# Patient Record
Sex: FEMALE | Race: WHITE | Employment: FULL TIME | URBAN - METROPOLITAN AREA
[De-identification: names, ages, dates, MRNs, and addresses within clinical notes are randomized per-mention and may not be internally consistent; named-entity substitution may affect disease eponyms.]

---
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
## 2020-02-26 ENCOUNTER — HOSPITAL ENCOUNTER (EMERGENCY)
Age: 20
Discharge: HOME | End: 2020-02-26

## 2020-02-26 VITALS
SYSTOLIC BLOOD PRESSURE: 108 MMHG | OXYGEN SATURATION: 96 % | DIASTOLIC BLOOD PRESSURE: 64 MMHG | BODY MASS INDEX: 25.71 KG/M2 | HEIGHT: 66 IN | HEART RATE: 90 BPM | WEIGHT: 160 LBS | RESPIRATION RATE: 16 BRPM | TEMPERATURE: 97.8 F

## 2020-02-26 PROCEDURE — 99282 EMERGENCY DEPT VISIT SF MDM: CPT

## 2020-02-26 RX ORDER — PREDNISONE 10 MG/1
40 TABLET ORAL DAILY
Qty: 12 TABLET | Refills: 0 | Status: SHIPPED | OUTPATIENT
Start: 2020-02-26 | End: 2020-02-29

## 2020-02-26 RX ORDER — AMOXICILLIN AND CLAVULANATE POTASSIUM 875; 125 MG/1; MG/1
1 TABLET, FILM COATED ORAL 2 TIMES DAILY
Qty: 20 TABLET | Refills: 0 | Status: SHIPPED | OUTPATIENT
Start: 2020-02-26 | End: 2020-03-07

## 2020-02-26 NOTE — ED PROVIDER NOTES
[facility]         Department of Emergency Medicine   ED  Provider Note  Admit Date/RoomTime: 2/26/2020  5:46 PM  ED Room: 30/30  Chief Complaint:   Otalgia (started on left side three weeks ago and has moved to right. Has been seen at [facility] and given claritan but it has gotten worse.  ) and Nasal Congestion    History of Present Illness   Source of history provided by:  patient. History/Exam Limitations: none. Rich Juares is a 23 y.o. old female with a past medical history of:   Past Medical History:   Diagnosis Date    GERD (gastroesophageal reflux disease)     presents to the emergency department by private vehicle with a friend for complaints of inability to hear out of her left ear and states it moved to the right ear and she will get a 'popping' sensation every time she blows her nose. She went to the pharmacy and they told her to take Claritin D and  eardrops over-the-counter and feels she has sinus pain with no improvement. She denies any fever or chills and she reports that this has been ongoing for the past 3 weeks and has gotten worse. She denies any head trauma, fever, chills, neck pain, cough, sore throat, nausea, vomiting or any other symptoms. There has been NO abdominal pain, appetite decrease, chest pain, conjunctivitis, cough, diarrhea, dizziness, dysuria, fatigue, headache, hoarseness, nausea, neck stiffness, rash, swollen glands, urinary frequency, vomiting or wheezing. ROS   Pertinent positives and negatives are stated within HPI, all other systems reviewed and are negative. Past Surgical History:  has no past surgical history on file. Social History:  reports that she has never smoked. She has never used smokeless tobacco. She reports that she does not drink alcohol or use drugs. Family History: family history includes No Known Problems in her father and mother. Allergies: Patient has no known allergies.     Physical Exam           ED Triage Vitals   BP Temp Temp was not done. Upper respiratory infection is unlikely to  be viral in etiology. Antibiotics are indicated at this time based on clinical presentation and physical findings. She is not hypoxic. Patient is well appearing, non toxic and appropriate for outpatient management. Instructed on avoiding blowing her nose and will give referral to ENT for hearing loss. Plan of Care: Normal progression of disease discussed. All questions answered. Instruction provided in the use of fluids, vaporizer, acetaminophen, and other OTC medication for symptom control. Extra fluids  Analgesics as needed, dose reviewed. Follow up as needed should symptoms fail to improve. Counseling: The emergency provider has spoken with the patient and discussed todays results, in addition to providing specific details for the plan of care and counseling regarding the diagnosis and prognosis. Questions are answered at this time and they are agreeable with the plan. Assessment     1. Acute sinusitis, recurrence not specified, unspecified location    2. Hearing loss of left ear, unspecified hearing loss type      Plan   Discharge to home  Patient condition is good    New Medications     Discharge Medication List as of 2/26/2020  6:39 PM      START taking these medications    Details   amoxicillin-clavulanate (AUGMENTIN) 875-125 MG per tablet Take 1 tablet by mouth 2 times daily for 10 days, Disp-20 tablet, R-0Print      predniSONE (DELTASONE) 10 MG tablet Take 4 tablets by mouth daily for 3 days, Disp-12 tablet, R-0Print           Electronically signed by NOEMI Graff CNP   DD: 2/26/20  **This report was transcribed using voice recognition software. Every effort was made to ensure accuracy; however, inadvertent computerized transcription errors may be present.   END OF ED PROVIDER NOTE        NOEMI Graff CNP  02/27/20 [id number]

## 2021-07-03 ENCOUNTER — HOSPITAL ENCOUNTER (EMERGENCY)
Age: 21
Discharge: HOME | End: 2021-07-03
Attending: EMERGENCY MEDICINE

## 2021-07-03 VITALS
TEMPERATURE: 97.9 F | SYSTOLIC BLOOD PRESSURE: 98 MMHG | HEART RATE: 70 BPM | RESPIRATION RATE: 14 BRPM | WEIGHT: 160 LBS | HEIGHT: 66 IN | DIASTOLIC BLOOD PRESSURE: 59 MMHG | BODY MASS INDEX: 25.71 KG/M2 | OXYGEN SATURATION: 97 %

## 2021-07-03 DIAGNOSIS — F19.10 DRUG ABUSE (HCC): Primary | ICD-10-CM

## 2021-07-03 LAB
CHP ED QC CHECK: YES
GLUCOSE BLD-MCNC: 129 MG/DL
HCG, URINE, POC: NEGATIVE
Lab: NORMAL
METER GLUCOSE: 129 MG/DL (ref 74–99)
NEGATIVE QC PASS/FAIL: NORMAL
POSITIVE QC PASS/FAIL: NORMAL

## 2021-07-03 PROCEDURE — 82962 GLUCOSE BLOOD TEST: CPT

## 2021-07-03 PROCEDURE — 99284 EMERGENCY DEPT VISIT MOD MDM: CPT

## 2021-07-03 ASSESSMENT — ENCOUNTER SYMPTOMS
SHORTNESS OF BREATH: 0
DIARRHEA: 0
WHEEZING: 0
EYE REDNESS: 0
SORE THROAT: 0
COUGH: 0
ABDOMINAL DISTENTION: 0
EYE PAIN: 0
VOMITING: 0
NAUSEA: 0
BACK PAIN: 0
EYE DISCHARGE: 0
SINUS PRESSURE: 0

## 2021-07-03 NOTE — ED PROVIDER NOTES
Patient reports that she was up all night last night with her boyfriend and other friends partying. She admits to alcohol throughout the night as well as marijuana and this morning took 2 tablets of Ultram.  She then fell asleep and her friends had difficulty waking her. They summoned EMS. Upon arrival here, she is awake but sleepy. No complaints. The history is provided by the patient. Drug / Alcohol Assessment  This is a new problem. The current episode started 1 to 2 hours ago. The problem occurs constantly. The problem has been gradually improving. Pertinent negatives include no chest pain, no headaches and no shortness of breath. Nothing aggravates the symptoms. Nothing relieves the symptoms. She has tried nothing for the symptoms. Review of Systems   Constitutional: Negative for chills and fever. HENT: Negative for ear pain, sinus pressure and sore throat. Eyes: Negative for pain, discharge and redness. Respiratory: Negative for cough, shortness of breath and wheezing. Cardiovascular: Negative for chest pain. Gastrointestinal: Negative for abdominal distention, diarrhea, nausea and vomiting. Genitourinary: Negative for dysuria and frequency. Musculoskeletal: Negative for arthralgias and back pain. Skin: Negative for rash and wound. Neurological: Negative for weakness and headaches. Hematological: Negative for adenopathy. Psychiatric/Behavioral: Negative for suicidal ideas. All other systems reviewed and are negative. Physical Exam  Vitals and nursing note reviewed. Constitutional:       Appearance: She is well-developed. HENT:      Head: Normocephalic and atraumatic. Eyes:      Pupils: Pupils are equal, round, and reactive to light. Cardiovascular:      Rate and Rhythm: Normal rate and regular rhythm. Heart sounds: Normal heart sounds. No murmur heard. Pulmonary:      Effort: Pulmonary effort is normal. No respiratory distress.       Breath Positive QC Pass/Fail Pass     Negative QC Pass/Fail Pass    POCT Glucose   Result Value Ref Range    Glucose 129 mg/dL    QC OK? yes    POCT Glucose   Result Value Ref Range    Meter Glucose 129 (H) 74 - 99 mg/dL       Radiology:  No orders to display       ------------------------- NURSING NOTES AND VITALS REVIEWED ---------------------------  Date / Time Roomed:  7/3/2021  5:45 AM  ED Bed Assignment:  21/21    The nursing notes within the ED encounter and vital signs as below have been reviewed. BP (!) 98/59   Pulse 70   Temp 97.9 °F (36.6 °C) (Oral)   Resp 14   Ht 5' 6\" (1.676 m)   Wt 160 lb (72.6 kg)   SpO2 97%   BMI 25.82 kg/m²   Oxygen Saturation Interpretation: Normal      ------------------------------------------ PROGRESS NOTES ------------------------------------------  8:26 AM EDT  I have spoken with the patient and discussed todays results, in addition to providing specific details for the plan of care and counseling regarding the diagnosis and prognosis. Their questions are answered at this time and they are agreeable with the plan. I discussed at length with them reasons for immediate return here for re evaluation. They will followup with their primary care physician by calling their office tomorrow. --------------------------------- ADDITIONAL PROVIDER NOTES ---------------------------------  At this time the patient is without objective evidence of an acute process requiring hospitalization or inpatient management. They have remained hemodynamically stable throughout their entire ED visit and are stable for discharge with outpatient follow-up. The plan has been discussed in detail and they are aware of the specific conditions for emergent return, as well as the importance of follow-up. New Prescriptions    No medications on file       Diagnosis:  1. Drug abuse ([facility] [address]        Disposition:  Patient's disposition: Discharge to home  Patient's condition is stable.        Bill Calabrese DO Abe  07/03/21 [id number]

## 2022-11-28 ENCOUNTER — INITIAL PRENATAL (OUTPATIENT)
Dept: OBGYN | Age: 22
End: 2022-11-28

## 2022-11-28 VITALS
HEIGHT: 66 IN | HEART RATE: 107 BPM | SYSTOLIC BLOOD PRESSURE: 133 MMHG | WEIGHT: 162 LBS | BODY MASS INDEX: 26.03 KG/M2 | DIASTOLIC BLOOD PRESSURE: 62 MMHG

## 2022-11-28 DIAGNOSIS — Z32.01 POSITIVE URINE PREGNANCY TEST: ICD-10-CM

## 2022-11-28 DIAGNOSIS — Z32.01 POSITIVE URINE PREGNANCY TEST: Primary | ICD-10-CM

## 2022-11-28 LAB
ALBUMIN SERPL-MCNC: 3.8 G/DL (ref 3.5–5.2)
ALP BLD-CCNC: 86 U/L (ref 35–104)
ALT SERPL-CCNC: 14 U/L (ref 0–32)
ANION GAP SERPL CALCULATED.3IONS-SCNC: 13 MMOL/L (ref 7–16)
AST SERPL-CCNC: 18 U/L (ref 0–31)
BASOPHILS ABSOLUTE: 0.03 E9/L (ref 0–0.2)
BASOPHILS RELATIVE PERCENT: 0.2 % (ref 0–2)
BILIRUB SERPL-MCNC: <0.2 MG/DL (ref 0–1.2)
BUN BLDV-MCNC: 7 MG/DL (ref 6–20)
CALCIUM SERPL-MCNC: 9.2 MG/DL (ref 8.6–10.2)
CHLORIDE BLD-SCNC: 102 MMOL/L (ref 98–107)
CO2: 21 MMOL/L (ref 22–29)
CONTROL: NORMAL
CREAT SERPL-MCNC: 0.6 MG/DL (ref 0.5–1)
EOSINOPHILS ABSOLUTE: 0.27 E9/L (ref 0.05–0.5)
EOSINOPHILS RELATIVE PERCENT: 1.9 % (ref 0–6)
GFR SERPL CREATININE-BSD FRML MDRD: >60 ML/MIN/1.73
GLUCOSE BLD-MCNC: 86 MG/DL (ref 74–99)
HCT VFR BLD CALC: 32.7 % (ref 34–48)
HEMOGLOBIN: 10.7 G/DL (ref 11.5–15.5)
IMMATURE GRANULOCYTES #: 0.08 E9/L
IMMATURE GRANULOCYTES %: 0.6 % (ref 0–5)
LYMPHOCYTES ABSOLUTE: 1.97 E9/L (ref 1.5–4)
LYMPHOCYTES RELATIVE PERCENT: 13.6 % (ref 20–42)
MCH RBC QN AUTO: 31 PG (ref 26–35)
MCHC RBC AUTO-ENTMCNC: 32.7 % (ref 32–34.5)
MCV RBC AUTO: 94.8 FL (ref 80–99.9)
MONOCYTES ABSOLUTE: 0.66 E9/L (ref 0.1–0.95)
MONOCYTES RELATIVE PERCENT: 4.5 % (ref 2–12)
NEUTROPHILS ABSOLUTE: 11.52 E9/L (ref 1.8–7.3)
NEUTROPHILS RELATIVE PERCENT: 79.2 % (ref 43–80)
PDW BLD-RTO: 13 FL (ref 11.5–15)
PLATELET # BLD: 386 E9/L (ref 130–450)
PMV BLD AUTO: 10 FL (ref 7–12)
POTASSIUM SERPL-SCNC: 4.1 MMOL/L (ref 3.5–5)
PREGNANCY TEST URINE, POC: POSITIVE
RBC # BLD: 3.45 E12/L (ref 3.5–5.5)
SODIUM BLD-SCNC: 136 MMOL/L (ref 132–146)
TOTAL PROTEIN: 6.5 G/DL (ref 6.4–8.3)
VITAMIN D 25-HYDROXY: 26 NG/ML (ref 30–100)
WBC # BLD: 14.5 E9/L (ref 4.5–11.5)

## 2022-11-28 PROCEDURE — 99203 OFFICE O/P NEW LOW 30 MIN: CPT | Performed by: STUDENT IN AN ORGANIZED HEALTH CARE EDUCATION/TRAINING PROGRAM

## 2022-11-28 PROCEDURE — 99204 OFFICE O/P NEW MOD 45 MIN: CPT | Performed by: STUDENT IN AN ORGANIZED HEALTH CARE EDUCATION/TRAINING PROGRAM

## 2022-11-28 PROCEDURE — 36415 COLL VENOUS BLD VENIPUNCTURE: CPT | Performed by: STUDENT IN AN ORGANIZED HEALTH CARE EDUCATION/TRAINING PROGRAM

## 2022-11-28 PROCEDURE — 81025 URINE PREGNANCY TEST: CPT | Performed by: STUDENT IN AN ORGANIZED HEALTH CARE EDUCATION/TRAINING PROGRAM

## 2022-11-28 NOTE — PROGRESS NOTES
Dionna Muniz     Patient presents for new OB visit. [facility] or get blood work done  She believes she got oregnanct in June because she took a home pregnancy test in July that was positive  Recently on Depo  She is nauseous and vomits sometimes but she is able to keep most food down    Baby is moving. She denies vaginal bleeding, LOF, cramps, contractions    Menarche at 10; had regular periods prior to getting on depo. Denies STD history. Prenatal labs ordered, referral placed to [facility]. Toxins, food, vaccines reviewed. Patient offered flu vaccine, patient will consider. Patient is taking a prenatal vitamin daily. Needs refill    Patient smokes and would like the patch to help quit. She is not urrently working    Partner is Michael, he has  a 8year old son. No domestic violence issues  Past Medical History:   Diagnosis Date    GERD (gastroesophageal reflux disease)         No past surgical history on file.      Family History   Problem Relation Age of Onset    No Known Problems Mother     No Known Problems Father         Social History       Tobacco History       Smoking Status  Every Day Smoking Tobacco Type  Cigarettes      Smokeless Tobacco Use  Never              Alcohol History       Alcohol Use Status  No              Drug Use       Drug Use Status  Yes Types  Marijuana (Weed) Comment  most days              Sexual Activity       Sexually Active  Yes Partners  Male                      Current Outpatient Medications:     naproxen (NAPROSYN) 375 MG tablet, Take 1 tablet by mouth 2 times daily (with meals) (Patient not taking: Reported on 11/28/2022), Disp: 40 tablet, Rfl: 1    famotidine (PEPCID) 20 MG tablet, Take 1 tablet by mouth 2 times daily for 7 days, Disp: 14 tablet, Rfl: 0    diphenhydrAMINE (BENADRYL) 25 MG capsule, Take 1 capsule by mouth every 8 hours as needed for Itching (Patient not taking: Reported on 11/28/2022), Disp: 15 capsule, Rfl: 0    famotidine (PEPCID) 20 MG tablet, Take 1 tablet by mouth 2 times daily for 7 days, Disp: 14 tablet, Rfl: 0    ondansetron (ZOFRAN-ODT) 4 MG disintegrating tablet, Take 4 mg by mouth every 8 hours as needed for Nausea or Vomiting (Patient not taking: Reported on 11/28/2022), Disp: , Rfl:      No Known Allergies     Vitals:    11/28/22 1315   BP: 133/62   Pulse: (!) 107        Physical Exam:  General: NAD, AAO x 3    Breasts: breasts appear normal, no suspicious masses, no skin or nipple changes or axillary nodes. Pelvic exam: normal external genitalia, vulva, vagina, cervix. Fundal height is around 20 Cm at umbilicus    Dionna was seen today for initial prenatal visit and nausea & vomiting. Diagnoses and all orders for this visit:    Positive urine pregnancy test  -     POCT urine pregnancy  -     Comprehensive Metabolic Panel; Future  -     Type and Screen; Future  -     Vitamin D 25 Hydroxy; Future  -     Rubella Antibody, IgM; Future  -     RPR; Future  -     C.trachomatis N.gonorrhoeae DNA, Urine; Future  -     PAP SMEAR  -     HIV Screen; Future  -     Culture, Urine; Future  -     Hepatitis B Surface Antigen; Future  -     Hepatitis C Antibody; Future  -     CBC with Auto Differential; Future  -     US OB LESS THAN 14 WEEKS SINGLE OR FIRST GESTATION; Future  -     Ambulatory referral to Maternal Fetal      F/u in 4 weeks  Will send prescription for nicotine patch and PNV to pharmacy  Stressed importance of smoking cessation and getting the covid and flu shots    No follow-ups on file.      Matias Ray,

## 2022-11-28 NOTE — PROGRESS NOTES
New Patient alert and pleasant with complaints of nausea and vomiting with acid reflux. Here today with FOB for initial prenatal visit. Fetal heart tones obtained without difficulty. Urine for pregnancy obtained with positive results   All blood work and urine for culture and GCC obtained, labeled and sent to lab  Pelvic exam, pap smear obtained, labeled  and delivered to lab. Discharge instructions have been discussed with the patient. Patient advised to call our office with any questions or concerns. Voiced understanding.

## 2022-11-29 LAB
ABO/RH: NORMAL
ANTIBODY SCREEN: NORMAL
HEPATITIS B SURFACE ANTIGEN INTERPRETATION: NORMAL
HEPATITIS C ANTIBODY INTERPRETATION: NORMAL
RPR: NORMAL

## 2022-11-30 LAB
HIV-1 AND HIV-2 ANTIBODIES: NORMAL
URINE CULTURE, ROUTINE: NORMAL

## 2022-12-01 ENCOUNTER — TELEPHONE (OUTPATIENT)
Dept: OBGYN | Age: 22
End: 2022-12-01

## 2022-12-01 LAB
C. TRACHOMATIS DNA ,URINE: NEGATIVE
N. GONORRHOEAE DNA, URINE: NEGATIVE
RUBELLA IGM: <10 AU/ML
SOURCE: NORMAL

## 2022-12-01 NOTE — TELEPHONE ENCOUNTER
Patient called in stating that the Rx for PNV and nicotine patch was not sent to her pharmacy. Please send both to pharmacy on file.

## 2022-12-02 LAB
CHLAMYDIA BY THIN PREP: NEGATIVE
N. GONORRHOEAE DNA, THIN PREP: NEGATIVE
SOURCE: NORMAL

## 2022-12-12 ENCOUNTER — TELEPHONE (OUTPATIENT)
Dept: LABOR AND DELIVERY | Age: 22
End: 2022-12-12

## 2022-12-12 RX ORDER — PNV NO.95/FERROUS FUM/FOLIC AC 28MG-0.8MG
1 TABLET ORAL DAILY
Qty: 90 TABLET | Refills: 2 | Status: SHIPPED | OUTPATIENT
Start: 2022-12-12 | End: 2023-02-15

## 2023-01-27 ENCOUNTER — ANCILLARY PROCEDURE (OUTPATIENT)
Dept: OBGYN CLINIC | Age: 23
DRG: 560 | End: 2023-01-27
Payer: MEDICAID

## 2023-01-27 ENCOUNTER — INITIAL PRENATAL (OUTPATIENT)
Dept: OBGYN CLINIC | Age: 23
End: 2023-01-27
Payer: MEDICAID

## 2023-01-27 VITALS
BODY MASS INDEX: 27.12 KG/M2 | SYSTOLIC BLOOD PRESSURE: 106 MMHG | HEART RATE: 104 BPM | DIASTOLIC BLOOD PRESSURE: 73 MMHG | WEIGHT: 168 LBS

## 2023-01-27 DIAGNOSIS — Z34.03 PRIMIGRAVIDA IN THIRD TRIMESTER: Primary | ICD-10-CM

## 2023-01-27 DIAGNOSIS — Z3A.33 33 WEEKS GESTATION OF PREGNANCY: ICD-10-CM

## 2023-01-27 DIAGNOSIS — O09.33 LATE PRENATAL CARE AFFECTING PREGNANCY IN THIRD TRIMESTER: ICD-10-CM

## 2023-01-27 DIAGNOSIS — F17.200 SMOKER: ICD-10-CM

## 2023-01-27 DIAGNOSIS — F12.90 MARIJUANA USE: ICD-10-CM

## 2023-01-27 LAB
GLUCOSE URINE, POC: NEGATIVE
PROTEIN UA: POSITIVE

## 2023-01-27 PROCEDURE — 81002 URINALYSIS NONAUTO W/O SCOPE: CPT | Performed by: OBSTETRICS & GYNECOLOGY

## 2023-01-27 PROCEDURE — 99213 OFFICE O/P EST LOW 20 MIN: CPT | Performed by: OBSTETRICS & GYNECOLOGY

## 2023-01-27 PROCEDURE — 76805 OB US >/= 14 WKS SNGL FETUS: CPT | Performed by: OBSTETRICS & GYNECOLOGY

## 2023-01-27 NOTE — PROGRESS NOTES
Pt here for new patient ultrasound  Pt unsure of dates  Pt denies any bleeding/cramping  Pt states good fetal movment

## 2023-01-27 NOTE — PROGRESS NOTES
[facility] PHYSICIANS [place] [facility]  [address] [address]  [phone]   [facility] [place] [facility]  [address] [address]  Dept: [phone]  Loc: [phone]     2023    RE:  Dionna Muniz     : 2000   AGE: 22 y.o.     Dear Dr. Carbajal,    Thank you for allowing me to see Dionna Muniz.  As I'm sure you will recall, Dionna Muniz is a 22 y.o. [id number]'s last menstrual period was 07/10/2022. Estimated Date of Delivery: 3/15/23 at 33w2d seen in our office today for the following:    REASON FOR VISIT: Level II    Patient Active Problem List    Diagnosis Date Noted    Late prenatal care affecting pregnancy in third trimester 2023    Primigravida in third trimester 2023    Smoker 2023    33 weeks gestation of pregnancy 2023    Marijuana use 2023        PAST HISTORY:  OB History    Para Term  AB Living   1             SAB IAB Ectopic Molar Multiple Live Births                    # Outcome Date GA Lbr Russell/2nd Weight Sex Delivery Anes PTL Lv   1 Current                   MEDICAL:  Past Medical History:   Diagnosis Date    GERD (gastroesophageal reflux disease)     Stress incontinence         SURGICAL:  History reviewed. No pertinent surgical history.    ALLERGIES:    No Known Allergies      MEDICATIONS:    Current Outpatient Medications   Medication Sig Dispense Refill    Prenatal Vit-Fe Fumarate-FA (PRENATAL VITAMIN) 27-0.8 MG TABS Take 1 tablet by mouth daily 90 tablet 2    nicotine (NICODERM CQ) 7 MG/24HR Place 1 patch onto the skin daily for 14 days 14 patch 0    naproxen (NAPROSYN) 375 MG tablet Take 1 tablet by mouth 2 times daily (with meals) (Patient not taking: No sig reported) 40 tablet 1    famotidine (PEPCID) 20 MG tablet Take 1 tablet by mouth 2 times daily  for 7 days 14 tablet 0    diphenhydrAMINE (BENADRYL) 25 MG capsule Take 1 capsule by mouth every 8 hours as needed for Itching (Patient not taking: No sig reported) 15 capsule 0    famotidine (PEPCID) 20 MG tablet Take 1 tablet by mouth 2 times daily for 7 days 14 tablet 0    ondansetron (ZOFRAN-ODT) 4 MG disintegrating tablet Take 4 mg by mouth every 8 hours as needed for Nausea or Vomiting (Patient not taking: No sig reported)       No current facility-administered medications for this visit. Social History     Socioeconomic History    Marital status: Single     Spouse name: None    Number of children: None    Years of education: None    Highest education level: None   Tobacco Use    Smoking status: Every Day     Packs/day: 0.50     Years: 6.00     Pack years: 3.00     Types: Cigarettes    Smokeless tobacco: Never   Vaping Use    Vaping Use: Never used   Substance and Sexual Activity    Alcohol use: No    Drug use: Yes     Frequency: 4.0 times per week     Types: Marijuana (Weed)     Comment: most days    Sexual activity: Yes     Partners: Male          FAMILY MEDICAL HISTORY:   Family History   Problem Relation Age of Onset    Diabetes Maternal Grandmother     No Known Problems Father     No Known Problems Mother           PHYSICAL EXAMINATION:  /73   Pulse (!) 104   Wt 168 lb (76.2 kg)   LMP 07/10/2022   Body mass index is 27.12 kg/m². Urine dipstick:  Results for POC orders placed in visit on 01/27/23   POCT urine qual dipstick protein   Result Value Ref Range    Protein, UA Positive (A) Negative   POCT urine qual dipstick glucose   Result Value Ref Range    Glucose, UA POC negative         A/an Level II ultrasound was done in our office today. Please refer to the enclosed copy of the ultrasound report for further information. Discussion:  There is a lester fetus in a vertex presentation.   Fetal cardiac motion and fetal motion was detected and appear to be grossly normal.  Limited ultrasound due to fetal position advanced maternal age of the baby no anomalies were noted  By today's ultrasound she should be 33 weeks and 2 days assuming that the baby is appropriately grown. The placenta is anterior. Amniotic fluid volume is normal.    IMPRESSION:  1. Single intrauterine gestation at 33+ weeks Limited prenatal care and limited views based on fetal position and advanced gestational age. 2.  No obvious fetal anomalies are noted. The fetus is in a vertex presentation. 3.  The amniotic fluid volume is normal and the placenta is anterior. RECOMMENDATIONS:  Each of the recommendations were discussed with the patient:  1. Return in 4 weeks for growth ultrasound to help establish due date. 2.  Follow-up would be otherwise as clinically indicated. The patient is to continue to follow with you in your office for ongoing obstetric care. PLAN:    As noted above or sooner prn.     Sincerely,        Hayde Landeros MD

## 2023-01-27 NOTE — PATIENT INSTRUCTIONS

## 2023-01-29 ENCOUNTER — ANESTHESIA EVENT (OUTPATIENT)
Dept: MATERNITY | Age: 23
DRG: 560 | End: 2023-01-29
Payer: MEDICAID

## 2023-01-29 ENCOUNTER — ANESTHESIA (OUTPATIENT)
Dept: MATERNITY | Age: 23
DRG: 560 | End: 2023-01-29
Payer: MEDICAID

## 2023-01-29 ENCOUNTER — HOSPITAL ENCOUNTER (INPATIENT)
Age: 23
LOS: 2 days | Discharge: HOME | DRG: 560 | End: 2023-01-31
Attending: OBSTETRICS & GYNECOLOGY | Admitting: OBSTETRICS & GYNECOLOGY
Payer: MEDICAID

## 2023-01-29 PROBLEM — O43.193 MARGINAL INSERTION OF UMBILICAL CORD AFFECTING MANAGEMENT OF MOTHER IN THIRD TRIMESTER: Status: ACTIVE | Noted: 2023-01-29

## 2023-01-29 PROBLEM — R82.5 POSITIVE URINE DRUG SCREEN: Status: ACTIVE | Noted: 2023-01-29

## 2023-01-29 PROBLEM — O09.33 LIMITED PRENATAL CARE IN THIRD TRIMESTER: Status: ACTIVE | Noted: 2023-01-29

## 2023-01-29 LAB
ABO/RH: NORMAL
AMPHETAMINE SCREEN, URINE: NOT DETECTED
ANTIBODY SCREEN: NORMAL
BARBITURATE SCREEN URINE: NOT DETECTED
BENZODIAZEPINE SCREEN, URINE: NOT DETECTED
CANNABINOID SCREEN URINE: POSITIVE
COCAINE METABOLITE SCREEN URINE: NOT DETECTED
FENTANYL SCREEN, URINE: POSITIVE
HCT VFR BLD CALC: 35.4 % (ref 34–48)
HEMOGLOBIN: 11.8 G/DL (ref 11.5–15.5)
Lab: ABNORMAL
MCH RBC QN AUTO: 31.1 PG (ref 26–35)
MCHC RBC AUTO-ENTMCNC: 33.3 % (ref 32–34.5)
MCV RBC AUTO: 93.4 FL (ref 80–99.9)
METHADONE SCREEN, URINE: NOT DETECTED
OPIATE SCREEN URINE: NOT DETECTED
OXYCODONE URINE: NOT DETECTED
PDW BLD-RTO: 13.8 FL (ref 11.5–15)
PHENCYCLIDINE SCREEN URINE: NOT DETECTED
PLATELET # BLD: 443 E9/L (ref 130–450)
PMV BLD AUTO: 9.3 FL (ref 7–12)
RBC # BLD: 3.79 E12/L (ref 3.5–5.5)
WBC # BLD: 26.5 E9/L (ref 4.5–11.5)

## 2023-01-29 PROCEDURE — 85027 COMPLETE CBC AUTOMATED: CPT

## 2023-01-29 PROCEDURE — 86900 BLOOD TYPING SEROLOGIC ABO: CPT

## 2023-01-29 PROCEDURE — 36415 COLL VENOUS BLD VENIPUNCTURE: CPT

## 2023-01-29 PROCEDURE — 88307 TISSUE EXAM BY PATHOLOGIST: CPT

## 2023-01-29 PROCEDURE — 86901 BLOOD TYPING SEROLOGIC RH(D): CPT

## 2023-01-29 PROCEDURE — 10907ZC DRAINAGE OF AMNIOTIC FLUID, THERAPEUTIC FROM PRODUCTS OF CONCEPTION, VIA NATURAL OR ARTIFICIAL OPENING: ICD-10-PCS | Performed by: OBSTETRICS & GYNECOLOGY

## 2023-01-29 PROCEDURE — 7200000001 HC VAGINAL DELIVERY

## 2023-01-29 PROCEDURE — 80307 DRUG TEST PRSMV CHEM ANLYZR: CPT

## 2023-01-29 PROCEDURE — 6360000002 HC RX W HCPCS

## 2023-01-29 PROCEDURE — 6360000002 HC RX W HCPCS: Performed by: NURSE PRACTITIONER

## 2023-01-29 PROCEDURE — G0480 DRUG TEST DEF 1-7 CLASSES: HCPCS

## 2023-01-29 PROCEDURE — 2500000003 HC RX 250 WO HCPCS: Performed by: ANESTHESIOLOGY

## 2023-01-29 PROCEDURE — 6370000000 HC RX 637 (ALT 250 FOR IP): Performed by: OBSTETRICS & GYNECOLOGY

## 2023-01-29 PROCEDURE — 2580000003 HC RX 258: Performed by: NURSE PRACTITIONER

## 2023-01-29 PROCEDURE — 51701 INSERT BLADDER CATHETER: CPT

## 2023-01-29 PROCEDURE — 86850 RBC ANTIBODY SCREEN: CPT

## 2023-01-29 PROCEDURE — 3700000025 EPIDURAL BLOCK: Performed by: ANESTHESIOLOGY

## 2023-01-29 PROCEDURE — 1220000000 HC SEMI PRIVATE OB R&B

## 2023-01-29 RX ORDER — ACETAMINOPHEN 500 MG
1000 TABLET ORAL EVERY 6 HOURS PRN
Status: DISCONTINUED | OUTPATIENT
Start: 2023-01-29 | End: 2023-01-31 | Stop reason: HOSPADM

## 2023-01-29 RX ORDER — SODIUM CHLORIDE, SODIUM LACTATE, POTASSIUM CHLORIDE, CALCIUM CHLORIDE 600; 310; 30; 20 MG/100ML; MG/100ML; MG/100ML; MG/100ML
INJECTION, SOLUTION INTRAVENOUS CONTINUOUS
Status: DISCONTINUED | OUTPATIENT
Start: 2023-01-29 | End: 2023-01-29

## 2023-01-29 RX ORDER — BETAMETHASONE SODIUM PHOSPHATE AND BETAMETHASONE ACETATE 3; 3 MG/ML; MG/ML
INJECTION, SUSPENSION INTRA-ARTICULAR; INTRALESIONAL; INTRAMUSCULAR; SOFT TISSUE
Status: COMPLETED
Start: 2023-01-29 | End: 2023-01-29

## 2023-01-29 RX ORDER — FAMOTIDINE 20 MG/1
20 TABLET, FILM COATED ORAL 2 TIMES DAILY
Status: CANCELLED | OUTPATIENT
Start: 2023-01-29

## 2023-01-29 RX ORDER — MODIFIED LANOLIN
OINTMENT (GRAM) TOPICAL PRN
Status: DISCONTINUED | OUTPATIENT
Start: 2023-01-29 | End: 2023-01-31 | Stop reason: HOSPADM

## 2023-01-29 RX ORDER — PRENATAL WITH FERROUS FUM AND FOLIC ACID 3080; 920; 120; 400; 22; 1.84; 3; 20; 10; 1; 12; 200; 27; 25; 2 [IU]/1; [IU]/1; MG/1; [IU]/1; MG/1; MG/1; MG/1; MG/1; MG/1; MG/1; UG/1; MG/1; MG/1; MG/1; MG/1
1 TABLET ORAL
Status: DISCONTINUED | OUTPATIENT
Start: 2023-01-30 | End: 2023-01-31 | Stop reason: HOSPADM

## 2023-01-29 RX ORDER — ACETAMINOPHEN 650 MG
TABLET, EXTENDED RELEASE ORAL
Status: COMPLETED
Start: 2023-01-29 | End: 2023-01-29

## 2023-01-29 RX ORDER — METHYLERGONOVINE MALEATE 0.2 MG/ML
200 INJECTION INTRAVENOUS PRN
Status: DISCONTINUED | OUTPATIENT
Start: 2023-01-29 | End: 2023-01-29

## 2023-01-29 RX ORDER — BETAMETHASONE SODIUM PHOSPHATE AND BETAMETHASONE ACETATE 3; 3 MG/ML; MG/ML
12 INJECTION, SUSPENSION INTRA-ARTICULAR; INTRALESIONAL; INTRAMUSCULAR; SOFT TISSUE ONCE
Status: COMPLETED | OUTPATIENT
Start: 2023-01-29 | End: 2023-01-29

## 2023-01-29 RX ORDER — MISOPROSTOL 200 UG/1
800 TABLET ORAL PRN
Status: DISCONTINUED | OUTPATIENT
Start: 2023-01-29 | End: 2023-01-29

## 2023-01-29 RX ORDER — ONDANSETRON 2 MG/ML
4 INJECTION INTRAMUSCULAR; INTRAVENOUS EVERY 6 HOURS PRN
Status: DISCONTINUED | OUTPATIENT
Start: 2023-01-29 | End: 2023-01-29

## 2023-01-29 RX ORDER — PENICILLIN G POTASSIUM 5000000 [IU]/1
INJECTION, POWDER, FOR SOLUTION INTRAMUSCULAR; INTRAVENOUS
Status: DISPENSED
Start: 2023-01-29 | End: 2023-01-30

## 2023-01-29 RX ORDER — DOCUSATE SODIUM 100 MG/1
100 CAPSULE, LIQUID FILLED ORAL 2 TIMES DAILY
Status: DISCONTINUED | OUTPATIENT
Start: 2023-01-29 | End: 2023-01-29 | Stop reason: SDUPTHER

## 2023-01-29 RX ORDER — IBUPROFEN 600 MG/1
600 TABLET ORAL EVERY 6 HOURS PRN
Status: DISCONTINUED | OUTPATIENT
Start: 2023-01-29 | End: 2023-01-31 | Stop reason: HOSPADM

## 2023-01-29 RX ORDER — SODIUM CHLORIDE, SODIUM LACTATE, POTASSIUM CHLORIDE, AND CALCIUM CHLORIDE .6; .31; .03; .02 G/100ML; G/100ML; G/100ML; G/100ML
500 INJECTION, SOLUTION INTRAVENOUS PRN
Status: DISCONTINUED | OUTPATIENT
Start: 2023-01-29 | End: 2023-01-29

## 2023-01-29 RX ORDER — NALOXONE HYDROCHLORIDE 0.4 MG/ML
INJECTION, SOLUTION INTRAMUSCULAR; INTRAVENOUS; SUBCUTANEOUS PRN
Status: DISCONTINUED | OUTPATIENT
Start: 2023-01-29 | End: 2023-01-29

## 2023-01-29 RX ORDER — PENICILLIN G 3000000 [IU]/50ML
3 INJECTION, SOLUTION INTRAVENOUS EVERY 4 HOURS
Status: DISCONTINUED | OUTPATIENT
Start: 2023-01-29 | End: 2023-01-29

## 2023-01-29 RX ORDER — CARBOPROST TROMETHAMINE 250 UG/ML
250 INJECTION, SOLUTION INTRAMUSCULAR PRN
Status: DISCONTINUED | OUTPATIENT
Start: 2023-01-29 | End: 2023-01-29

## 2023-01-29 RX ORDER — SODIUM CHLORIDE, SODIUM LACTATE, POTASSIUM CHLORIDE, AND CALCIUM CHLORIDE .6; .31; .03; .02 G/100ML; G/100ML; G/100ML; G/100ML
1000 INJECTION, SOLUTION INTRAVENOUS PRN
Status: DISCONTINUED | OUTPATIENT
Start: 2023-01-29 | End: 2023-01-29

## 2023-01-29 RX ORDER — PNV NO.95/FERROUS FUM/FOLIC AC 28MG-0.8MG
1 TABLET ORAL
Status: DISCONTINUED | OUTPATIENT
Start: 2023-01-30 | End: 2023-01-29 | Stop reason: CLARIF

## 2023-01-29 RX ORDER — DOCUSATE SODIUM 100 MG/1
100 CAPSULE, LIQUID FILLED ORAL 2 TIMES DAILY
Status: DISCONTINUED | OUTPATIENT
Start: 2023-01-29 | End: 2023-01-31 | Stop reason: HOSPADM

## 2023-01-29 RX ADMIN — BETAMETHASONE SODIUM PHOSPHATE AND BETAMETHASONE ACETATE 12 MG: 3; 3 INJECTION, SUSPENSION INTRA-ARTICULAR; INTRALESIONAL; INTRAMUSCULAR; SOFT TISSUE at 16:25

## 2023-01-29 RX ADMIN — Medication 166.7 ML: at 19:42

## 2023-01-29 RX ADMIN — Medication 5 ML: at 17:53

## 2023-01-29 RX ADMIN — Medication: at 19:35

## 2023-01-29 RX ADMIN — DEXTROSE MONOHYDRATE 5 MILLION UNITS: 50 INJECTION, SOLUTION INTRAVENOUS at 16:26

## 2023-01-29 RX ADMIN — Medication 15 ML/HR: at 18:04

## 2023-01-29 RX ADMIN — SODIUM CHLORIDE, POTASSIUM CHLORIDE, SODIUM LACTATE AND CALCIUM CHLORIDE 500 ML: 600; 310; 30; 20 INJECTION, SOLUTION INTRAVENOUS at 16:43

## 2023-01-29 RX ADMIN — IBUPROFEN 600 MG: 600 TABLET, FILM COATED ORAL at 21:38

## 2023-01-29 RX ADMIN — BETAMETHASONE SODIUM PHOSPHATE AND BETAMETHASONE ACETATE 12 MG: 3; 3 INJECTION, SUSPENSION INTRA-ARTICULAR; INTRALESIONAL; INTRAMUSCULAR at 16:25

## 2023-01-29 RX ADMIN — Medication 5 ML: at 18:00

## 2023-01-29 ASSESSMENT — PAIN DESCRIPTION - ORIENTATION: ORIENTATION: MID

## 2023-01-29 ASSESSMENT — PAIN DESCRIPTION - DESCRIPTORS: DESCRIPTORS: ACHING;CRAMPING

## 2023-01-29 ASSESSMENT — PAIN SCALES - GENERAL: PAINLEVEL_OUTOF10: 4

## 2023-01-29 ASSESSMENT — PAIN DESCRIPTION - LOCATION: LOCATION: ABDOMEN;VAGINA

## 2023-01-29 NOTE — PROGRESS NOTES
Per ob RN pt complete but  DR Hannah Zurita requesting an epidural, discussed case with DR Tiara ornelas to proceed if pt able to sit for procedure

## 2023-01-29 NOTE — H&P
Department of Obstetrics and Gynecology  Labor and Delivery  History & Physical    Patient:  Ray Medrano     Admit Date:  2023  3:40 PM  Medical Record Number:  [id number]    CHIEF COMPLAINT:   labor    PROBLEM LIST:     Patient Active Problem List   Diagnosis    Late prenatal care affecting pregnancy in third trimester    Primigravida in third trimester    Smoker    33 weeks gestation of pregnancy    Marijuana use    Limited prenatal care in third trimester    Active  labor, single or unspecified fetus           HISTORY OF PRESENT ILLNESS:    The patient is a 25 y.o.  female  at 26w1d. Patient presents with a chief complaint as above, complaints of vaginal bleeding when wiping, cramping like a menstrual period that has become worse. ESTIMATED DUE DATE: Estimated Date of Delivery: 3/15/23    PRENATAL CARE:  Complicated by: late prenatal care,        Past OB History  OB History          1    Para        Term                AB        Living             SAB        IAB        Ectopic        Molar        Multiple        Live Births                    Past Medical History:        Diagnosis Date    GERD (gastroesophageal reflux disease)     Stress incontinence        Past Surgical History:    No past surgical history on file. Allergies:  Patient has no known allergies.     Social History:    Social History     Socioeconomic History    Marital status: Single     Spouse name: Not on file    Number of children: Not on file    Years of education: Not on file    Highest education level: Not on file   Occupational History    Not on file   Tobacco Use    Smoking status: Every Day     Packs/day: 0.50     Years: 6.00     Pack years: 3.00     Types: Cigarettes    Smokeless tobacco: Never   Vaping Use    Vaping Use: Never used   Substance and Sexual Activity    Alcohol use: No    Drug use: Yes     Frequency: 4.0 times per week     Types: Marijuana (Weed)     Comment: most days    Sexual activity: Yes     Partners: Male   Other Topics Concern    Not on file   Social History Narrative    Not on file     Social Determinants of Health     Financial Resource Strain: Not on file   Food Insecurity: Not on file   Transportation Needs: Not on file   Physical Activity: Not on file   Stress: Not on file   Social Connections: Not on file   Intimate Partner Violence: Not on file   Housing Stability: Not on file       Family History:       Problem Relation Age of Onset    Diabetes Maternal Grandmother     No Known Problems Father     No Known Problems Mother        Medications Prior to Admission:  Medications Prior to Admission: Prenatal Vit-Fe Fumarate-FA (PRENATAL VITAMIN) 27-0.8 MG TABS, Take 1 tablet by mouth daily  nicotine (NICODERM CQ) 7 MG/24HR, Place 1 patch onto the skin daily for 14 days  naproxen (NAPROSYN) 375 MG tablet, Take 1 tablet by mouth 2 times daily (with meals) (Patient not taking: No sig reported)  famotidine (PEPCID) 20 MG tablet, Take 1 tablet by mouth 2 times daily for 7 days  diphenhydrAMINE (BENADRYL) 25 MG capsule, Take 1 capsule by mouth every 8 hours as needed for Itching (Patient not taking: No sig reported)  famotidine (PEPCID) 20 MG tablet, Take 1 tablet by mouth 2 times daily for 7 days  ondansetron (ZOFRAN-ODT) 4 MG disintegrating tablet, Take 4 mg by mouth every 8 hours as needed for Nausea or Vomiting (Patient not taking: No sig reported)    REVIEW OF SYSTEMS:  CONSTITUTIONAL:  negative  RESPIRATORY:  negative  CARDIOVASCULAR:  negative  GASTROINTESTINAL:  negative  ALLERGIC/IMMUNOLOGIC:  negative  NEUROLOGICAL:  negative  BEHAVIOR/PSYCH:  negative    PHYSICAL EXAM:  Vitals:    01/29/23 1558   BP: 124/65   Pulse: 63   Resp: 18   Temp: 97.8 °F (36.6 °C)   TempSrc: Oral   Weight: 168 lb (76.2 kg)   Height: 5' 5\" (1.651 m)     General appearance:  awake, alert, cooperative, no apparent distress, and appears stated age  Neurologic:  Awake, alert, oriented to name, place and time.  Skin: warm, dry, scabbed sores over entire body  Head:  NC,AT,NT  Eyes:  Sclerae white, pupils equal and reactive, red reflex normal bilaterally  Ears:  Well-positioned, well-formed pinnae; Hearing: intact  Nose:  Clear, normal mucosa  Throat:  Lips, tongue and mucosa are pink, moist and intact; no exudate  Neck:  Supple, symmetrical  Heart:  Regular rate & rhythm, S1 S2, no murmurs, rubs, or gallops  Lungs:  No increased work of breathing, good air exchange, CTA b/l. Abdomen:  Soft, non tender, gravid, consistent with her gestational age  Extremities: No clubbing, cyanosis, cords; No calf tenderness; Edema: no  Pulses:  Strong equal distal pulses, brisk capillary refill  Fetal heart rate:  Reassuring.   Pelvis:  Adequate pelvis  Cervix: 9 cm / 100% / soft / +2  Contraction frequency:  nurse adjusting toco  Membranes:  Intact    SSE-performed prior to exam, fetal head visualized  Labs:  Recent Results (from the past 72 hour(s))   POCT urine qual dipstick protein    Collection Time: 23  1:25 PM   Result Value Ref Range    Protein, UA Positive (A) Negative   POCT urine qual dipstick glucose    Collection Time: 23  1:26 PM   Result Value Ref Range    Glucose, UA POC negative        ASSESSMENT:  25 y.o.  female at 26w1d    pregnancy <37 weeks late prenatal care, 1 ob visit and 1 mfm visit    Patient Active Problem List   Diagnosis    Late prenatal care affecting pregnancy in third trimester    Primigravida in third trimester    Smoker    33 weeks gestation of pregnancy    Marijuana use    Limited prenatal care in third trimester    Active  labor, single or unspecified fetus     Fetus: Reassuring  GBS: not done    PLAN:  [address] for  delivery  Pcn q 4 hours  [facility]  Nicu notified  Routine admission orders    NOEMI Castro - CNP  2023 4:26 PM

## 2023-01-29 NOTE — ANESTHESIA PRE PROCEDURE
Department of Anesthesiology  Preprocedure Note       Name:  Toan Villalba   Age:  25 y.o.  :  2000                                          MRN:  [id number]         Date:  2023      Surgeon: * No surgeons listed *    Procedure: * No procedures listed *    Medications prior to admission:   Prior to Admission medications    Medication Sig Start Date End Date Taking?  Authorizing Provider   Prenatal Vit-Fe Fumarate-FA (PRENATAL VITAMIN) 27-0.8 MG TABS Take 1 tablet by mouth daily 22   Joyce Ramos DO   nicotine (NICODERM CQ) 7 MG/24HR Place 1 patch onto the skin daily for 14 days 22  Joyce Ramos DO   naproxen (NAPROSYN) 375 MG tablet Take 1 tablet by mouth 2 times daily (with meals)  Patient not taking: No sig reported 3/13/18   Mesha Lee PA-C   famotidine (PEPCID) 20 MG tablet Take 1 tablet by mouth 2 times daily for 7 days 17  Dada Esquivel DO   diphenhydrAMINE (BENADRYL) 25 MG capsule Take 1 capsule by mouth every 8 hours as needed for Itching  Patient not taking: No sig reported 17   Dada Esquivel DO   famotidine (PEPCID) 20 MG tablet Take 1 tablet by mouth 2 times daily for 7 days 9/8/17 9/15/17  NOEMI Becker CNP   ondansetron (ZOFRAN-ODT) 4 MG disintegrating tablet Take 4 mg by mouth every 8 hours as needed for Nausea or Vomiting  Patient not taking: No sig reported    Historical Provider, MD       Current medications:    Current Facility-Administered Medications   Medication Dose Route Frequency Provider Last Rate Last Admin    lactated ringers IV soln infusion   IntraVENous Continuous Vilinda Purple, APRN - CNP        lactated ringers bolus  500 mL IntraVENous PRN Vilinda Purple, APRN - CNP        Or    lactated ringers bolus  1,000 mL IntraVENous PRN Vilinda Purple, APRN - CNP        methylergonovine (METHERGINE) injection 200 mcg  200 mcg IntraMUSCular PRN Vilinda Purple, APRN - CNP        carboprost (HEMABATE) injection 250 mcg  250 mcg IntraMUSCular PRN Justina Emeterio, APRN - CNP        miSOPROStol (CYTOTEC) tablet 800 mcg  800 mcg Rectal PRN Justina Emeterio, APRN - CNP        oxytocin (PITOCIN) 15 units in 250 mL infusion  87.3 cristobal-units/min IntraVENous Continuous PRN Justina Emeterio, APRN - CNP        And    oxytocin (PITOCIN) 10 unit bolus from the bag  10 Units IntraVENous PRN Justina Emeterio, APRN - CNP        ondansetron [facility] injection 4 mg  4 mg IntraVENous Q6H PRN Justina Emeterio, APRN - CNP        penicillin G potassium IVPB 3 Million Units  3 Million Units IntraVENous Q4H Justina Emeterio, APRN - CNP         Facility-Administered Medications Ordered in Other Encounters   Medication Dose Route Frequency Provider Last Rate Last Admin    penicillin G potassium [id number] units injection             oxytocin (PITOCIN) 15 units in 250 mL infusion Override Pull                Allergies:  No Known Allergies    Problem List:    Patient Active Problem List   Diagnosis Code    Late prenatal care affecting pregnancy in third trimester O09.33    Primigravida in third trimester Z34.03    Smoker F17.200    33 weeks gestation of pregnancy Z3A.33    Marijuana use F12.90    Limited prenatal care in third trimester O09.33    Active  labor, single or unspecified fetus O60.10X0       Past Medical History:        Diagnosis Date    GERD (gastroesophageal reflux disease)     Stress incontinence        Past Surgical History:  No past surgical history on file. Social History:    Social History     Tobacco Use    Smoking status: Every Day     Packs/day: 0.50     Years: 6.00     Pack years: 3.00     Types: Cigarettes    Smokeless tobacco: Never   Substance Use Topics    Alcohol use:  No                                Ready to quit: Not Answered  Counseling given: Not Answered      Vital Signs (Current):   Vitals:    23 1558   BP: 124/65   Pulse: 63   Resp: 18   Temp: 36.6 °C (97.8 °F)   TempSrc: Oral   Weight: 168 lb (76.2 kg)   Height: 5' 5\" (1.651 m)                                              BP Readings from Last 3 Encounters:   01/29/23 124/65   01/27/23 106/73   11/28/22 133/62       NPO Status:                                                                                 BMI:   Wt Readings from Last 3 Encounters:   01/29/23 168 lb (76.2 kg)   01/27/23 168 lb (76.2 kg)   11/28/22 162 lb (73.5 kg)     Body mass index is 27.96 kg/m². CBC:   Lab Results   Component Value Date/Time    WBC 26.5 01/29/2023 04:15 PM    RBC 3.79 01/29/2023 04:15 PM    HGB 11.8 01/29/2023 04:15 PM    HCT 35.4 01/29/2023 04:15 PM    MCV 93.4 01/29/2023 04:15 PM    RDW 13.8 01/29/2023 04:15 PM     01/29/2023 04:15 PM       CMP:   Lab Results   Component Value Date/Time     11/28/2022 02:24 PM    K 4.1 11/28/2022 02:24 PM     11/28/2022 02:24 PM    CO2 21 11/28/2022 02:24 PM    BUN 7 11/28/2022 02:24 PM    CREATININE 0.6 11/28/2022 02:24 PM    LABGLOM >60 11/28/2022 02:24 PM    GLUCOSE 86 11/28/2022 02:24 PM    PROT 6.5 11/28/2022 02:24 PM    CALCIUM 9.2 11/28/2022 02:24 PM    BILITOT <0.2 11/28/2022 02:24 PM    ALKPHOS 86 11/28/2022 02:24 PM    AST 18 11/28/2022 02:24 PM    ALT 14 11/28/2022 02:24 PM       POC Tests: No results for input(s): POCGLU, POCNA, POCK, POCCL, POCBUN, POCHEMO, POCHCT in the last 72 hours. Coags: No results found for: PROTIME, INR, APTT    HCG (If Applicable):   Lab Results   Component Value Date    PREGTESTUR POSITIVE 11/28/2022        ABGs: No results found for: PHART, PO2ART, YOS0NVG, EWD3NAX, BEART, X0UVFNBE     Type & Screen (If Applicable):  No results found for: LABABO, LABRH    Drug/Infectious Status (If Applicable):  No results found for: HIV, HEPCAB    COVID-19 Screening (If Applicable): No results found for: COVID19        Anesthesia Evaluation  Patient summary reviewed no history of anesthetic complications (denies self and family hx):    Airway: Mallampati: III  TM distance: >3 FB   Neck ROM: full  Mouth opening: > = 3 FB   Dental:          Pulmonary: breath sounds clear to auscultation                            ROS comment: Marijuana abuse las smoked today x  6 years  Every day smoker 1/2 ppd 6 years   Cardiovascular:Negative CV ROS            Rhythm: regular  Rate: normal                    Neuro/Psych:   Negative Neuro/Psych ROS              GI/Hepatic/Renal:   (+) GERD (no medications):,           Endo/Other: Negative Endo/Other ROS                    Abdominal:             Vascular: Other Findings:           Anesthesia Plan      general, spinal and epidural     ASA 2     (Risks and benefits discused agree to proceed with epidural/ pt npo 000 solids and liquids this am)        Anesthetic plan and risks discussed with patient. Use of blood products discussed with patient whom consented to blood products. Plan discussed with attending.                     Judith Blount, APRN - CRNA   1/29/2023

## 2023-01-29 NOTE — PROGRESS NOTES
Patient resting comfortably; denies pain/pressure or sensation to push. Patient instructed to notify staff if pain/pressure increases.

## 2023-01-29 NOTE — ANESTHESIA PROCEDURE NOTES
Epidural Block    Patient location during procedure: OB  Start time: 1/29/2023 5:37 PM  End time: 1/29/2023 6:00 PM  Reason for block: labor epidural  Staffing  Anesthesiologist: Nereida Stevens MD  Resident/CRNA: NOEMI Guerra - CRNA  Epidural  Patient position: sitting  Prep: ChloraPrep  Patient monitoring: cardiac monitor, continuous pulse ox and frequent blood pressure checks  Approach: midline  Location: L3-4  Injection technique: SHASHANK air  Provider prep: mask and sterile gloves  Needle  Needle type: Tuohy   Needle gauge: 18 G  Needle length: 3.5 in  Needle insertion depth: 5 cm  Catheter type: end hole  Catheter size: 20 G.   Catheter at skin depth: 11 cm  Test dose: negative (5086)Catheter Secured: tegaderm and tape  Assessment  Sensory level: T10  Hemodynamics: stable  Attempts: 1  Outcomes: uncomplicated and patient tolerated procedure well  Preanesthetic Checklist  Completed: patient identified, IV checked, site marked, risks and benefits discussed, surgical/procedural consents, equipment checked, pre-op evaluation, timeout performed, anesthesia consent given, oxygen available and monitors applied/VS acknowledged

## 2023-01-29 NOTE — PROGRESS NOTES
. 33.4. Presents to unit with c/o ctxs that began yesterday morning. Patient states that pain began yesterday and felt \"like a period cramp but now it's a lot worse\". Patient states that she can feel the ctxs ~ q 10 min. Patient states that when she went to the bathroom this date, she wiped and noted blood on the toilet paper. Patient also states that she noticed 2 blood clots in the toilet, states that they were smaller then the size of a pea. Denies active VB at this time, states that she has not had to utilize a menstrual pad as bleeding was only when she wiped following bathroom use. Patient states that she also noted yellow colored, thick vaginal drng this date, no odor and no itch or pain to site. Perceives + FM. Limited Prenatal care noted; patient states that she was seen [facility] ( [facility] prior ) and seen by MFM  x 1 since.

## 2023-01-30 ENCOUNTER — APPOINTMENT (OUTPATIENT)
Dept: GENERAL RADIOLOGY | Age: 23
DRG: 560 | End: 2023-01-30
Payer: MEDICAID

## 2023-01-30 LAB
BASOPHILS ABSOLUTE: 0.05 E9/L (ref 0–0.2)
BASOPHILS RELATIVE PERCENT: 0.2 % (ref 0–2)
BILIRUBIN URINE: NEGATIVE
BLOOD, URINE: NEGATIVE
CLARITY: CLEAR
COLOR: YELLOW
COMMENT: NORMAL
EOSINOPHILS ABSOLUTE: 0.01 E9/L (ref 0.05–0.5)
EOSINOPHILS RELATIVE PERCENT: 0 % (ref 0–6)
FENTANYL, URN, QUANT: 20.2
GLUCOSE URINE: NEGATIVE MG/DL
HCT VFR BLD CALC: 35.3 % (ref 34–48)
HEMOGLOBIN: 11.4 G/DL (ref 11.5–15.5)
IMMATURE GRANULOCYTES #: 0.42 E9/L
IMMATURE GRANULOCYTES %: 1.3 % (ref 0–5)
INTEGRITY CHECK, CREATININE, URINE: 147.4
INTEGRITY CHECK, OXIDANT, URINE: <40
INTEGRITY CHECK, PH, URINE: 7.4 (ref 4.5–9)
INTEGRITY CHECK, SPECIFIC GRAVITY, URINE: 1.02 (ref 1–1.03)
INTEGRITY CHECK, SPECIMEN INTEGRITY, URINE: NORMAL
KETONES, URINE: NEGATIVE MG/DL
LEUKOCYTE ESTERASE, URINE: NEGATIVE
LYMPHOCYTES ABSOLUTE: 1.62 E9/L (ref 1.5–4)
LYMPHOCYTES RELATIVE PERCENT: 5 % (ref 20–42)
MCH RBC QN AUTO: 30.5 PG (ref 26–35)
MCHC RBC AUTO-ENTMCNC: 32.3 % (ref 32–34.5)
MCV RBC AUTO: 94.4 FL (ref 80–99.9)
MONOCYTES ABSOLUTE: 1 E9/L (ref 0.1–0.95)
MONOCYTES RELATIVE PERCENT: 3.1 % (ref 2–12)
NEUTROPHILS ABSOLUTE: 29.6 E9/L (ref 1.8–7.3)
NEUTROPHILS RELATIVE PERCENT: 90.4 % (ref 43–80)
NITRITE, URINE: NEGATIVE
NORFENTANYL, URN, QUANT: >1000
PDW BLD-RTO: 14 FL (ref 11.5–15)
PH UA: 6.5 (ref 5–9)
PLATELET # BLD: 466 E9/L (ref 130–450)
PMV BLD AUTO: 9.4 FL (ref 7–12)
PROTEIN UA: NEGATIVE MG/DL
RBC # BLD: 3.74 E12/L (ref 3.5–5.5)
SPECIFIC GRAVITY UA: 1.01 (ref 1–1.03)
THC NORMALIZED, QUANTITIATIVE, URINE: 36
THC-COOH, QUANTITATIVE, URINE: 53
UROBILINOGEN, URINE: 0.2 E.U./DL
WBC # BLD: 32.7 E9/L (ref 4.5–11.5)

## 2023-01-30 PROCEDURE — 87040 BLOOD CULTURE FOR BACTERIA: CPT

## 2023-01-30 PROCEDURE — 36415 COLL VENOUS BLD VENIPUNCTURE: CPT

## 2023-01-30 PROCEDURE — 87088 URINE BACTERIA CULTURE: CPT

## 2023-01-30 PROCEDURE — 6370000000 HC RX 637 (ALT 250 FOR IP): Performed by: OBSTETRICS & GYNECOLOGY

## 2023-01-30 PROCEDURE — 81003 URINALYSIS AUTO W/O SCOPE: CPT

## 2023-01-30 PROCEDURE — 85025 COMPLETE CBC W/AUTO DIFF WBC: CPT

## 2023-01-30 PROCEDURE — 1220000000 HC SEMI PRIVATE OB R&B

## 2023-01-30 PROCEDURE — 71046 X-RAY EXAM CHEST 2 VIEWS: CPT

## 2023-01-30 RX ADMIN — IBUPROFEN 600 MG: 600 TABLET, FILM COATED ORAL at 11:48

## 2023-01-30 RX ADMIN — DOCUSATE SODIUM 100 MG: 100 CAPSULE, LIQUID FILLED ORAL at 07:57

## 2023-01-30 RX ADMIN — DOCUSATE SODIUM 100 MG: 100 CAPSULE, LIQUID FILLED ORAL at 19:58

## 2023-01-30 RX ADMIN — IBUPROFEN 600 MG: 600 TABLET, FILM COATED ORAL at 18:30

## 2023-01-30 RX ADMIN — METFORMIN HYDROCHLORIDE 1 TABLET: 500 TABLET, EXTENDED RELEASE ORAL at 07:57

## 2023-01-30 RX ADMIN — IBUPROFEN 600 MG: 600 TABLET, FILM COATED ORAL at 04:55

## 2023-01-30 ASSESSMENT — PAIN DESCRIPTION - LOCATION
LOCATION: ABDOMEN

## 2023-01-30 ASSESSMENT — PAIN SCALES - GENERAL
PAINLEVEL_OUTOF10: 3
PAINLEVEL_OUTOF10: 4
PAINLEVEL_OUTOF10: 5

## 2023-01-30 ASSESSMENT — PAIN DESCRIPTION - ORIENTATION
ORIENTATION: LOWER
ORIENTATION: LOWER
ORIENTATION: LOWER;MID

## 2023-01-30 ASSESSMENT — PAIN DESCRIPTION - DESCRIPTORS
DESCRIPTORS: DISCOMFORT;CRAMPING;SORE
DESCRIPTORS: CRAMPING;SORE
DESCRIPTORS: ACHING;DISCOMFORT;SORE

## 2023-01-30 ASSESSMENT — PAIN - FUNCTIONAL ASSESSMENT
PAIN_FUNCTIONAL_ASSESSMENT: ACTIVITIES ARE NOT PREVENTED

## 2023-01-30 NOTE — PROGRESS NOTES
Pumping supply provided to Pt. Pt requesting to sleep at this time. Educated on importance of pumping within a few hours of delivery and benefits for the infant. Pt instructed to call when ready to pump.

## 2023-01-30 NOTE — LACTATION NOTE
[facility] mom set up with an ebp at the bedside. Reviewed pump settings, cleaning pump parts and storage guidelines. Discussed positive drug screen for Marijuana and fentanyl. Mom reports only using Marijuana. Instructed mom she will have to pump and dump until her drug screens are clean and cleared through [facility]. States understanding. Encouraged mom to maintain a pumping routine to stimulate and establish breast milk supply, reviewed normal milk production. Mom requests an electric breast pump for home to increase milk supply. Support provided and encouraged to call with any needs.

## 2023-01-30 NOTE — PROGRESS NOTES
Patient admitted into room 308, oriented to surroundings. Fundus firm @ U/-1, small amount of bleeding, no clots. Admission Packet/safety/education reviewed with  Pt . She is refusing the T-DAP and flu vaccine. PPD questionnaire given to pt. Instructed to call for assistance.

## 2023-01-30 NOTE — PROGRESS NOTES
Post Partum Vaginal Delivery Progress Note      SUBJECTIVE:  No complaints      Vitals:  Vitals:    23 0830   BP:    Pulse: 64   Resp:    Temp:    SpO2:         Exam:  Fundus firm, non-tender, normal lochia  Extremities non-tender      DATA:    CBC:    Lab Results   Component Value Date/Time    WBC 32.7 2023 04:53 AM    RBC 3.74 2023 04:53 AM    HGB 11.4 2023 04:53 AM    HCT 35.3 2023 04:53 AM    MCV 94.4 2023 04:53 AM    RDW 14.0 2023 04:53 AM     2023 04:53 AM       ASSESSMENT & PLAN:  PPD # 1  Patient Active Problem List   Diagnosis    Late prenatal care affecting pregnancy in third trimester    Primigravida in third trimester    Smoker    33 4/7 weeks gestation of pregnancy    Marijuana use    Limited prenatal care in third trimester    Active  labor, single or unspecified fetus    Positive urine drug screen (cannabinoids and fentanyl)     (normal spontaneous vaginal delivery)     male  infant of 35 4/7 completed weeks of gestation    Marginal insertion of umbilical cord affecting management of mother in third trimester   Leukocytosis- WBC 32.7. Check urinalysis, urine culture, CXR, and blood cultures.  Repeat CBC in AM.    Routine postpartum care

## 2023-01-30 NOTE — L&D DELIVERY NOTE
Department of Obstetrics and Gynecology  Spontaneous Vaginal Delivery Note    Patient:  Maryuri Gore     Admit Date:  2023  3:40 PM  Medical Record Number:  [id number]   Procedure Date: 2023 8:27 PM     Pre-delivery Diagnosis: Primigravida IUP at 33 weeks 4 days, late and limited prenatal care,  labor, tobacco use, UDS positive for marijuana and fentanyl  Patient Active Problem List   Diagnosis    Late prenatal care affecting pregnancy in third trimester    Primigravida in third trimester    Smoker    33 4/7 weeks gestation of pregnancy    Marijuana use    Limited prenatal care in third trimester    Active  labor, single or unspecified fetus    Positive urine drug screen (cannabinoids and fentanyl)       Post-delivery Diagnosis:  Living   infant Male  Patient Active Problem List   Diagnosis    Late prenatal care affecting pregnancy in third trimester    Primigravida in third trimester    Smoker    33 4/7 weeks gestation of pregnancy    Marijuana use    Limited prenatal care in third trimester    Active  labor, single or unspecified fetus    Positive urine drug screen (cannabinoids and fentanyl)     (normal spontaneous vaginal delivery)     male  infant of 35 4/7 completed weeks of gestation    Marginal insertion of umbilical cord affecting management of mother in third trimester       Information for the patient's :  Shaista Villareal [[id number]]   Normal Spontaneous Vaginal Delivery, uncomplicated     Delivery Clinician: Cheryl Tubbs Wt:   Information for the patient's :  Shaista Villareal [[id number]]   4 pounds 12.9 ounces  2180 g     APGARS:     Information for the patient's :  Shaista Jacquie Sánchez Villareal [[id number]]   1 minute 8  5 minute 9    Anesthesia:  epidural anesthesia    Application and Delivery:  25 y.o.  W female  at 33w4d admitted at 5 cm for active  labor who progressed to complete and delivered post amniotomy Cephalic, left occiput anterior presentation via  @ 1937, under epidural anesthesia anesthesia,  over a right mediolateral episiotomy(due to end second stage bradycardia) without a resulting laceration, without dystocia or complication, a Live Born Male infant, weighing 4# 13oz, 2180 grams, Clear fluid at delivery, bulb suctioned on perineum. A nuchal cord was not present. A delayed cord clamping was performed. Spontaneous cry,  Apgar's 1 minute:  8; 5 minute:  9. The placenta was delivered with gentle traction and was noted to be intact, whole, and that the umbilical cord had three vessels noted. Episiotomy was performed in the mediolateral region. Repair performed in layers, per routine with  Vicryl 3-0 suture. Cervix, rectum, sphincter intact. Sponge, needle, and instrument counts correct x 2. Delivery Summary:  Mother's Information      Labor Events     Labor?: Yes  Cervical Ripening: N/A  Now     Boerio, Baby Boy Dionna [[id number]]      Labor Events     Labor?: Yes   Steroids?: Partial Course  Cervical Ripening Date/Time:  N/A   Antibiotics Received during Labor?: Yes  Rupture Identifier: Sac 1   Rupture Date/Time: 23 19:29:00   Rupture Type: AROM  Fluid Color: Meconium  Meconium Consistency: Thin  Fluid Odor: None  Fluid Volume:  Moderate, Scant  Induction: None  Augmentation: None  Labor Complications:  Labor    Anesthesia    Method: Epidural    Start Pushing      Labor onset date/time: 23 10:00:00 Now     Dilation complete date/time: 23 19:15:00 EST Now     Start pushing date/time: 2023 19:30:00     Labor Length    1st stage: 9h 15m  2nd stage: 0h 22m  3rd stage: 0h 05m    Delivery ([place])      Delivery Date/Time:  23 19:37:00   Delivery Type: Vaginal, Spontaneous    [place] Presentation    Presentation: Vertex  Position: Left  _: Occiput  _: Anterior    Shoulder Dystocia    Shoulder Dystocia Present?: No    Assisted Delivery Details    Forceps Attempted?: No  Vacuum Extractor Attempted?: No    Cord    Vessels: 3 Vessels  Complications: None  Delayed Cord Clamping?: Yes  Cord Clamped Date/Time: 2023 19:38:00  Cord Blood Disposition: Lab  Gases Sent?: Yes    Placenta    Date/Time: 2023 19:42:00  Removal: Spontaneous  Appearance: Intact  Disposition: Pathology    Lacerations    Episiotomy: Right Mediolateral  Perineal Lacerations: None  Other Lacerations: no non-perineal laceration  Number of Repair Packets: 1    Vaginal Counts    Initial Count Personnel: KAMILLE  Initial Count Verified By: TERRI    Sponges [employer]   Initial Counts Correct Correct Correct   Final Counts Correct Correct Correct   Final Count Personnel: ISIDRO  Final Count Verified By: TERRI  Accurate Final Count?: Yes  If the count is incorrect due to Intentionally Retained Foreign Object (IRFO) add the IRFO LDA in Lines/Drains.  Add LDA: Link to LDA    Blood Loss  Mother: Dionna Muniz #[id number]     Start of Mother's Information      Delivery Blood Loss  23 1000 - 23 2152      200 ml       End of Mother's Information  Mother: Dionna Muniz #[id number]      Delivery Providers    Delivering clinician: Jaime Luna MD     Provider Role    Jaime Luna MD Obstetrician    Elizabeth Morales, RN Primary Nurse    Vero Sadler Primary  Nurse    Patsy Morales RN Registered Nurse          Assessment    Living Status: Living     Apgar Scoring Key:    0 1 2    Skin Color: Blue or pale Acrocyanotic Completely pink    Heart Rate: Absent <100 bpm >100 bpm    Reflex Irritability: No response Grimace Cry or active withdrawal    Muscle Tone: Limp Some flexion Active motion    Respiratory Effort: Absent Weak cry; hypoventilation Good, crying                Skin Color:   Heart Rate:   Reflex Irritability:   Muscle Tone:   Respiratory Effort:   Total:            1 Minute:    0    2    2    2    2   8        Apgar 1 total from OB History    5 Minute:    1    2    2    2    2    9        Apgar 5 total from OB History              Apgars Assigned By: NICU         Resuscitation    Method: Bulb Suction, Stimulation, CPAP, Suctioning       Measurements      Birth Weight: 2180 g Birth Length: 0.445 m     Head Circumference: 0.3 m          Title      Reason Skin to Skin Not Initiated:  Acuity        Specimen:  Placenta sent to pathology      Estimated blood loss: 200 mL    Condition:  infant stable to general nursery and mother stable to maternity    Blood Type and Rh: A POS    Rubella Immunity Status:   Unknown           Infant Feeding:    breast    Attending Attestation: I performed the procedure.     Edi Azar MD MD, Boo Douglas  2023 8:27 PM

## 2023-01-30 NOTE — CARE COORDINATION
Social Work discharge planning    Sw consult \"Urine drug screen + for fentanyl and THC\". SW notified NICU Kathy Hayes. NICU SW will follow up with family.   Electronically signed by Radha Moreno on 1/30/2023 at 8:53 AM

## 2023-01-30 NOTE — PROGRESS NOTES
Assumed patient care    Call to Dr Carlos A Mccarthy complete, still intact    States he will be on his way

## 2023-01-30 NOTE — FLOWSHEET NOTE
Called and left a message for Alvin Brandt, Peer Support, as patient mentioned to NICU , Whitney, that she is interested in treatment.

## 2023-01-30 NOTE — PROGRESS NOTES
Recovery complete. Pt denies needing to use the restroom due to straight cath at 1950 for 450ml. Pain treated with ibuprofen. Yana care completed in bed. Pad, ice pack, and underwear placed on pt. Call placed to mom/baby. Pt ready for transport.

## 2023-01-30 NOTE — PLAN OF CARE
Problem: Discharge Planning  Goal: Discharge to home or other facility with appropriate resources  Outcome: Progressing     Problem: Pain  Goal: Verbalizes/displays adequate comfort level or baseline comfort level  Outcome: Progressing     Problem: Postpartum  Goal: Experiences normal postpartum course  Description:  Postpartum OB-Pregnancy care plan goal which identifies if the mother is experiencing a normal postpartum course  Outcome: Progressing  Goal: Appropriate maternal -  bonding  Description:  Postpartum OB-Pregnancy care plan goal which identifies if the mother and  are bonding appropriately  Outcome: Progressing  Goal: Establishment of infant feeding pattern  Description:  Postpartum OB-Pregnancy care plan goal which identifies if the mother is establishing a feeding pattern with their   Outcome: Progressing  Goal: Incisions, wounds, or drain sites healing without S/S of infection  Outcome: Progressing     Problem: Infection - Adult  Goal: Absence of infection at discharge  Outcome: Progressing  Goal: Absence of infection during hospitalization  Outcome: Progressing  Goal: Absence of fever/infection during anticipated neutropenic period  Outcome: Progressing     Problem: Safety - Adult  Goal: Free from fall injury  Outcome: Progressing     Problem: Chronic Conditions and Co-morbidities  Goal: Patient's chronic conditions and co-morbidity symptoms are monitored and maintained or improved  Outcome: Progressing     Problem: ABCDS Injury Assessment  Goal: Absence of physical injury  Outcome: Progressing

## 2023-01-30 NOTE — ANESTHESIA POSTPROCEDURE EVALUATION
Department of Anesthesiology  Postprocedure Note    Patient: Deidra Faith  MRN: [id number]  YOB: 2000  Date of evaluation: 1/30/2023      Procedure Summary     Date: 01/29/23 Room / Location:     Anesthesia Start: 1737 Anesthesia Stop: 1937    Procedure: Labor Analgesia Diagnosis:     Scheduled Providers:  Responsible Provider: Brenna Carrizales MD    Anesthesia Type: general, spinal, epidural ASA Status: 2          Anesthesia Type: No value filed.     Alicia Phase I:      Alicia Phase II:        Anesthesia Post Evaluation    Patient location during evaluation: floor  Patient participation: complete - patient participated  Level of consciousness: awake and alert  Pain score: 0  Airway patency: patent  Nausea & Vomiting: no nausea and no vomiting  Complications: no  Cardiovascular status: blood pressure returned to baseline  Respiratory status: acceptable  Hydration status: stable

## 2023-01-31 ENCOUNTER — HOSPITAL ENCOUNTER (OUTPATIENT)
Dept: PSYCHIATRY | Age: 23
Setting detail: THERAPIES SERIES
Discharge: HOME | End: 2023-01-31

## 2023-01-31 VITALS
SYSTOLIC BLOOD PRESSURE: 100 MMHG | RESPIRATION RATE: 16 BRPM | BODY MASS INDEX: 27.99 KG/M2 | DIASTOLIC BLOOD PRESSURE: 51 MMHG | HEIGHT: 65 IN | HEART RATE: 68 BPM | OXYGEN SATURATION: 99 % | TEMPERATURE: 97.9 F | WEIGHT: 168 LBS

## 2023-01-31 DIAGNOSIS — F11.20 UNCOMPLICATED OPIOID DEPENDENCE (HCC): Primary | ICD-10-CM

## 2023-01-31 PROBLEM — O09.33 LATE PRENATAL CARE AFFECTING PREGNANCY IN THIRD TRIMESTER: Status: RESOLVED | Noted: 2023-01-27 | Resolved: 2023-01-31

## 2023-01-31 PROBLEM — F17.200 SMOKER: Status: RESOLVED | Noted: 2023-01-27 | Resolved: 2023-01-31

## 2023-01-31 PROBLEM — O43.193 MARGINAL INSERTION OF UMBILICAL CORD AFFECTING MANAGEMENT OF MOTHER IN THIRD TRIMESTER: Status: RESOLVED | Noted: 2023-01-29 | Resolved: 2023-01-31

## 2023-01-31 PROBLEM — Z34.03 PRIMIGRAVIDA IN THIRD TRIMESTER: Status: RESOLVED | Noted: 2023-01-27 | Resolved: 2023-01-31

## 2023-01-31 PROBLEM — O09.33 LIMITED PRENATAL CARE IN THIRD TRIMESTER: Status: RESOLVED | Noted: 2023-01-29 | Resolved: 2023-01-31

## 2023-01-31 LAB
BASOPHILS ABSOLUTE: 0.05 E9/L (ref 0–0.2)
BASOPHILS RELATIVE PERCENT: 0.3 % (ref 0–2)
EOSINOPHILS ABSOLUTE: 0.19 E9/L (ref 0.05–0.5)
EOSINOPHILS RELATIVE PERCENT: 1 % (ref 0–6)
HCT VFR BLD CALC: 33.2 % (ref 34–48)
HEMOGLOBIN: 10.8 G/DL (ref 11.5–15.5)
IMMATURE GRANULOCYTES #: 0.25 E9/L
IMMATURE GRANULOCYTES %: 1.3 % (ref 0–5)
LYMPHOCYTES ABSOLUTE: 2.6 E9/L (ref 1.5–4)
LYMPHOCYTES RELATIVE PERCENT: 13.1 % (ref 20–42)
MCH RBC QN AUTO: 30.9 PG (ref 26–35)
MCHC RBC AUTO-ENTMCNC: 32.5 % (ref 32–34.5)
MCV RBC AUTO: 95.1 FL (ref 80–99.9)
MONOCYTES ABSOLUTE: 1.15 E9/L (ref 0.1–0.95)
MONOCYTES RELATIVE PERCENT: 5.8 % (ref 2–12)
NEUTROPHILS ABSOLUTE: 15.6 E9/L (ref 1.8–7.3)
NEUTROPHILS RELATIVE PERCENT: 78.5 % (ref 43–80)
PDW BLD-RTO: 14.2 FL (ref 11.5–15)
PLATELET # BLD: 424 E9/L (ref 130–450)
PMV BLD AUTO: 9.6 FL (ref 7–12)
RBC # BLD: 3.49 E12/L (ref 3.5–5.5)
WBC # BLD: 19.8 E9/L (ref 4.5–11.5)

## 2023-01-31 PROCEDURE — 85025 COMPLETE CBC W/AUTO DIFF WBC: CPT

## 2023-01-31 PROCEDURE — 6370000000 HC RX 637 (ALT 250 FOR IP): Performed by: OBSTETRICS & GYNECOLOGY

## 2023-01-31 PROCEDURE — 36415 COLL VENOUS BLD VENIPUNCTURE: CPT

## 2023-01-31 PROCEDURE — APPNB30 APP NON BILLABLE TIME 0-30 MINS: Performed by: ADVANCED PRACTICE MIDWIFE

## 2023-01-31 RX ORDER — IBUPROFEN 600 MG/1
600 TABLET ORAL EVERY 6 HOURS PRN
Qty: 120 TABLET | Refills: 3 | Status: SHIPPED | OUTPATIENT
Start: 2023-01-31

## 2023-01-31 RX ORDER — BUPRENORPHINE AND NALOXONE 8; 2 MG/1; MG/1
1 FILM, SOLUBLE BUCCAL; SUBLINGUAL 2 TIMES DAILY
Qty: 28 FILM | Refills: 0 | Status: SHIPPED | OUTPATIENT
Start: 2023-01-31 | End: 2023-02-14

## 2023-01-31 RX ORDER — DOCUSATE SODIUM 100 MG/1
100 CAPSULE, LIQUID FILLED ORAL 2 TIMES DAILY
Qty: 60 CAPSULE | Refills: 0 | Status: SHIPPED | OUTPATIENT
Start: 2023-01-31 | End: 2023-03-02

## 2023-01-31 RX ADMIN — METFORMIN HYDROCHLORIDE 1 TABLET: 500 TABLET, EXTENDED RELEASE ORAL at 08:26

## 2023-01-31 RX ADMIN — DOCUSATE SODIUM 100 MG: 100 CAPSULE, LIQUID FILLED ORAL at 08:26

## 2023-01-31 RX ADMIN — IBUPROFEN 600 MG: 600 TABLET, FILM COATED ORAL at 05:13

## 2023-01-31 ASSESSMENT — PAIN DESCRIPTION - LOCATION: LOCATION: ABDOMEN

## 2023-01-31 ASSESSMENT — PAIN DESCRIPTION - DESCRIPTORS: DESCRIPTORS: CRAMPING

## 2023-01-31 ASSESSMENT — PAIN DESCRIPTION - ORIENTATION: ORIENTATION: MID;LOWER

## 2023-01-31 ASSESSMENT — PAIN - FUNCTIONAL ASSESSMENT: PAIN_FUNCTIONAL_ASSESSMENT: ACTIVITIES ARE NOT PREVENTED

## 2023-01-31 ASSESSMENT — PAIN SCALES - GENERAL: PAINLEVEL_OUTOF10: 5

## 2023-01-31 NOTE — FLOWSHEET NOTE
Discharge instructions given. Questions and concerns addressed and answered. Video packed given to pt.
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
## 2023-01-31 NOTE — DISCHARGE SUMMARY
Department of Obstetrics and Gynecology  Labor and Delivery  Discharge Summary    Patient:  Delicia Rose         Medical Record Number:  [id number]    Admit Date:  2023  3:40 PM    Discharge Date: 2023    Final Diagnosis:   Active Problems:    33 4/7 weeks gestation of pregnancy    Marijuana use    Positive urine drug screen (cannabinoids and fentanyl)     (normal spontaneous vaginal delivery)     male  infant of 35 4/7 completed weeks of gestation  Resolved Problems:    Late prenatal care affecting pregnancy in third trimester    Primigravida in third trimester    Smoker    Limited prenatal care in third trimester    Active  labor, single or unspecified fetus    Marginal insertion of umbilical cord affecting management of mother in third trimester      Chief Complaint - Presenting Illness - Physical Findings:   Limited prenatal care in third trimester [O09.33]  Active  labor, single or unspecified fetus [O60.10X0]  HPI: Came in active  labor at 9 cm, progressed to complete with arom and epidural    Hospital Course:   Delivery Summary:  Mother's Information      Labor Events     Labor?: Yes  Cervical Ripening:   Now               Boerio, Baby Boy Allexis [[id number]]      Labor Events     Labor?: Yes   Steroids?: Partial Course  Cervical Ripening Date/Time:     Antibiotics Received during Labor?: Yes  Rupture Identifier: Sac 1   Rupture Date/Time: 23 19:29:00   Rupture Type: AROM  Fluid Color: Meconium  Meconium Consistency: Thin  Fluid Odor: None  Fluid Volume:  Moderate, Scant  Induction: None  Augmentation: None  Labor Complications:  Labor       Anesthesia    Method: Epidural       Start Pushing      Labor onset date/time: 23 10:00:00 Now     Dilation complete date/time: 23 19:15:00 EST Now     Start pushing date/time: 2023 19:30:00   Decision date/time (emergent ):           Labor Length    1st stage: 9h 15m  2nd stage: 0h 22m  3rd stage: 0h 05m       Delivery ()      Delivery Date/Time:  23 19:37:00   Delivery Type: Vaginal, Spontaneous    Details:             Presentation    Presentation: Vertex  Position: Left  _: Occiput  _: Anterior       Shoulder Dystocia    Shoulder Dystocia Present?: No  Add Second Maneuver  Add Third Maneuver  Add Fourth Maneuver  Add Fifth Maneuver  Add Sixth Maneuver  Add Seventh Maneuver  Add Eighth Maneuver  Add Ninth Maneuver       Assisted Delivery Details    Forceps Attempted?: No  Vacuum Extractor Attempted?: No       Document Additional Attempt         Document Additional Attempt                 Cord    Vessels: 3 Vessels  Complications: None  Delayed Cord Clamping?: Yes  Cord Clamped Date/Time: 2023 19:38:00  Cord Blood Disposition: Lab  Gases Sent?: Yes       Placenta    Date/Time: 2023 19:42:00  Removal: Spontaneous  Appearance: Intact  Disposition: Pathology       Lacerations    Episiotomy: Right Mediolateral  Perineal Lacerations: None  Other Lacerations: no non-perineal laceration  Number of Repair Packets: 1       Vaginal Counts    Initial Count Personnel: KAMILLE  Initial Count Verified By: Geetha Damon    Sponges [place] [employer]   Initial Counts Correct Correct Correct   Final Counts Correct Correct Correct   Final Count Personnel: Stacey Gomez  Final Count Verified By: Geetha Damon  Accurate Final Count?: Yes  If the count is incorrect due to Intentionally Retained Foreign Object (IRFO) add the IRFO LDA in Lines/Drains.   Add LDA: Link to [facility]       Blood Loss  Mother: Alverto Sarah #[id number]     Start of Mother's Information      Delivery Blood Loss  23 1000 - 23 1937      Quantitative Blood Loss (mL) Hospital Encounter 230 grams    Total  230 mL               End of Mother's Information  Mother: Alverto Sarah #[id number]                Delivery Providers    Delivering clinician: Kailey Krishnamurthy MD     Provider Role    Ab Rodríguez MD Obstetrician    Rachel Caba, RN Primary Nurse    Gianni Shi Primary  Nurse     NICU Nurse     Neonatologist     Anesthesiologist     Nurse Anesthetist     Nurse Practitioner     Midwife     Nursery Nurse    Chepe Person RN Registered Nurse               Assessment    Living Status: Living     Apgar Scoring Key:    0 1 2    Skin Color: Blue or pale Acrocyanotic Completely pink    Heart Rate: Absent <100 bpm >100 bpm    Reflex Irritability: No response Grimace Cry or active withdrawal    Muscle Tone: Limp Some flexion Active motion    Respiratory Effort: Absent Weak cry; hypoventilation Good, crying                      Skin Color:   Heart Rate:   Reflex Irritability:   Muscle Tone:   Respiratory Effort: Total:            1 Minute:    0    2    2    2    2    8        Apgar 1 total from OB History    5 Minute:    1    2    2    2    2    9        Apgar 5 total from OB History    10 Minute:              15 Minute:              20 Minute:                        Apgars Assigned By: NICU              Resuscitation    Method: Bulb Suction, Stimulation, CPAP, Suctioning              Measurements      Birth Weight: 2180 g Birth Length: 0.445 m     Head Circumference: 0.3 m               Title      Skin to Skin Initiation Date/Time:       Skin to Skin End Date/Time:       Reason Skin to Skin Not Initiated: [place] Acuity                    The patient had an unremarkable Hospital Course. Her care was advanced per routine protocol. Her vital signs were stable, she remained afebrile, and her physical exam was unremarkable throughout her hospitalization. She was subsequently discharged home on the second Hospital Day as she was tolerating her diet, ambulating well, voiding without difficulty and had appropriate flatus with a bowel movement.     Recent Results (from the past 72 hour(s))   CBC    Collection Time: 23  4:15 PM   Result Value Ref Range    WBC 26. 5 (H) 4.5 - 11.5 E9/L    RBC 3.79 3.50 - 5.50 E12/L    Hemoglobin 11.8 11.5 - 15.5 g/dL    Hematocrit 35.4 34.0 - 48.0 %    MCV 93.4 80.0 - 99.9 fL    MCH 31.1 26.0 - 35.0 pg    MCHC 33.3 32.0 - 34.5 %    RDW 13.8 11.5 - 15.0 fL    Platelets 071 730 - 479 E9/L    MPV 9.3 7.0 - 12.0 fL   TYPE AND SCREEN    Collection Time: 01/29/23  4:15 PM   Result Value Ref Range    ABO/Rh A POS     Antibody Screen NEG    Urine Drug Screen    Collection Time: 01/29/23  4:15 PM   Result Value Ref Range    Amphetamine Screen, Urine NOT DETECTED Negative <1000 ng/mL    Barbiturate Screen, Ur NOT DETECTED Negative < 200 ng/mL    Benzodiazepine Screen, Urine NOT DETECTED Negative < 200 ng/mL    Cannabinoid Scrn, Ur POSITIVE (A) Negative < 50ng/mL    Cocaine Metabolite Screen, Urine NOT DETECTED Negative < 300 ng/mL    Opiate Scrn, Ur NOT DETECTED Negative < 300ng/mL    PCP Screen, Urine NOT DETECTED Negative < 25 ng/mL    Methadone Screen, Urine NOT DETECTED Negative <300 ng/mL    Oxycodone Urine NOT DETECTED Negative <100 ng/mL    FENTANYL SCREEN, URINE POSITIVE (A) Negative <1 ng/mL    Drug Screen Comment: see below    THC-COOH, Quantitative, Urine    Collection Time: 01/29/23  4:15 PM   Result Value Ref Range    THC-COOH, QUANTITATIVE, URINE 53.0 Cutoff 15 ng/mL    THC Normalized, Quantitative, Urine 36.0 Cutoff 15 ng/mL    Comment see below    Specimen Validity Panel    Collection Time: 01/29/23  4:15 PM   Result Value Ref Range    Integrity Check, Oxidant, Urine <40 < 200  mg/L    Integrity Check, pH, Urine 7.4 4.5 - 9.0    Integrity Check, Specific Gravity, Urine 1.017 1.002 - 1.030    Integrity Check, Creatinine, Urine 147.4 22 - 250 mg/dL    Integrity Check, Specimen Integrity, Urine see below    FENTANYL, Quantitative, Urine    Collection Time: 01/29/23  4:15 PM   Result Value Ref Range    Fentanyl, Urine, Quant 20.2 Cutoff 5 ng/mL    Norfentanyl, Urine, Quant >1,000.0 Cutoff 10 ng/mL   CBC with Auto Differential Collection Time: 01/30/23  4:53 AM   Result Value Ref Range    WBC 32.7 (H) 4.5 - 11.5 E9/L    RBC 3.74 3.50 - 5.50 E12/L    Hemoglobin 11.4 (L) 11.5 - 15.5 g/dL    Hematocrit 35.3 34.0 - 48.0 %    MCV 94.4 80.0 - 99.9 fL    MCH 30.5 26.0 - 35.0 pg    MCHC 32.3 32.0 - 34.5 %    RDW 14.0 11.5 - 15.0 fL    Platelets 953 (H) 389 - 450 E9/L    MPV 9.4 7.0 - 12.0 fL    Neutrophils % 90.4 (H) 43.0 - 80.0 %    Immature Granulocytes % 1.3 0.0 - 5.0 %    Lymphocytes % 5.0 (L) 20.0 - 42.0 %    Monocytes % 3.1 2.0 - 12.0 %    Eosinophils % 0.0 0.0 - 6.0 %    Basophils % 0.2 0.0 - 2.0 %    Neutrophils Absolute 29.60 (H) 1.80 - 7.30 E9/L    Immature Granulocytes # 0.42 E9/L    Lymphocytes Absolute 1.62 1.50 - 4.00 E9/L    Monocytes Absolute 1.00 (H) 0.10 - 0.95 E9/L    Eosinophils Absolute 0.01 (L) 0.05 - 0.50 E9/L    Basophils Absolute 0.05 0.00 - 0.20 E9/L   Urinalysis    Collection Time: 01/30/23 10:13 AM   Result Value Ref Range    Color, UA Yellow Straw/Yellow    Clarity, UA Clear Clear    Glucose, Ur Negative Negative mg/dL    Bilirubin Urine Negative Negative    Ketones, Urine Negative Negative mg/dL    Specific Gravity, UA 1.015 1.005 - 1.030    Blood, Urine Negative Negative    pH, UA 6.5 5.0 - 9.0    Protein, UA Negative Negative mg/dL    Urobilinogen, Urine 0.2 <2.0 E.U./dL    Nitrite, Urine Negative Negative    Leukocyte Esterase, Urine Negative Negative   CBC with Auto Differential    Collection Time: 01/31/23  5:08 AM   Result Value Ref Range    WBC 19.8 (H) 4.5 - 11.5 E9/L    RBC 3.49 (L) 3.50 - 5.50 E12/L    Hemoglobin 10.8 (L) 11.5 - 15.5 g/dL    Hematocrit 33.2 (L) 34.0 - 48.0 %    MCV 95.1 80.0 - 99.9 fL    MCH 30.9 26.0 - 35.0 pg    MCHC 32.5 32.0 - 34.5 %    RDW 14.2 11.5 - 15.0 fL    Platelets 414 316 - 050 E9/L    MPV 9.6 7.0 - 12.0 fL    Neutrophils % 78.5 43.0 - 80.0 %    Immature Granulocytes % 1.3 0.0 - 5.0 %    Lymphocytes % 13.1 (L) 20.0 - 42.0 %    Monocytes % 5.8 2.0 - 12.0 %    Eosinophils % 1.0 0.0 - 6.0 %    Basophils % 0.3 0.0 - 2.0 %    Neutrophils Absolute 15.60 (H) 1.80 - 7.30 E9/L    Immature Granulocytes # 0.25 E9/L    Lymphocytes Absolute 2.60 1.50 - 4.00 E9/L    Monocytes Absolute 1.15 (H) 0.10 - 0.95 E9/L    Eosinophils Absolute 0.19 0.05 - 0.50 E9/L    Basophils Absolute 0.05 0.00 - 0.20 E9/L         Physicians Following Patient During Hospitalization - Reason For Care:  Admitting Physician: Shemar Carballo  Delivering Physician: Aubrey Fuentes Physician(s):    PP#1 manjit   PP#2 Clemente/August  Discharging Physician: Maritza/Filiberto  Consultant(s): Lactation and social work    Discharge Condition:  Level of Function:  Stable  Caregiver Arrangements and Educational Efforts: Written Discharge Instructions were verbally reviewed and given to the Patient. Special Problems: None    Plans For Continuing Care:  Unreported Test Results and Intended Follow-Up: 6 week(s) time. Instructions for Physical Activity: No driving for 1 week(s). No sex or tampon use for 6 weeks. No douching. No heavy lifting (greater than baby and carrier) for 6 weeks. Frequent ambulation with stairs - start slowly then increase as tolerated. Return to work in 6 weeks. Showers are fine - avoid bath tubs, hot tubs, swimming.    Instructions for Diet: Regular diet  Discharge Medications:  Current Discharge Medication List        START taking these medications    Details   ibuprofen (ADVIL;MOTRIN) 600 MG tablet Take 1 tablet by mouth every 6 hours as needed for Pain  Qty: 120 tablet, Refills: 3           CONTINUE these medications which have CHANGED    Details   nicotine (NICODERM CQ) 7 MG/24HR Place 1 patch onto the skin daily  Qty: 30 patch, Refills: 0           CONTINUE these medications which have NOT CHANGED    Details   Prenatal Vit-Fe Fumarate-FA (PRENATAL VITAMIN) 27-0.8 MG TABS Take 1 tablet by mouth daily  Qty: 90 tablet, Refills: 2           STOP taking these medications       naproxen (NAPROSYN) 375 MG tablet Comments:   Reason for Stopping:         famotidine (PEPCID) 20 MG tablet Comments:   Reason for Stopping:         diphenhydrAMINE (BENADRYL) 25 MG capsule Comments:   Reason for Stopping:         famotidine (PEPCID) 20 MG tablet Comments:   Reason for Stopping:         ondansetron (ZOFRAN-ODT) 4 MG disintegrating tablet Comments:   Reason for Stopping: Follow-Up Visit Plan: In 6 weeks for final postpartum visit. Disposition: Home. Time Spent on Discharge:  30 minutes were spent in patient examination, evaluation, counseling as well as medication reconciliation, prescriptions for required medications, discharge plan and follow up.       NOEMI Gardiner CNM   1/31/2023 8:13 AM

## 2023-01-31 NOTE — DISCHARGE INSTRUCTIONS

## 2023-01-31 NOTE — DISCHARGE INSTR - ACTIVITY
After Your Delivery Discharge Instructions    What to do after you leave the hospital:    Recommended diet: regular diet  Recommended activity: No driving for 1 weeks, no sex or tampon use for 6 weeks. No douching. No heavy lifting (greater than baby and carrier) for 6 weeks. Initially, avoid frequent ambulation with stairs - start slowly then increase as tolerated. You may return to work in 6 weeks. Showers are fine - avoid bath tubs, hot tubs, swimming. Follow-up in 6 weeks for final postpartum visit. Call the Physician with any of these signs and symptoms:    Warning signs regarding stitches:  \"Popping\" of stitches  Foul smelling discharge or pus  More redness or streaks around stitches than before    Perineum / episiotomy care:  Continue care as in the hospital (sitz baths, squirt bottle, etc.)  No tub baths until OK'd by your Physician , showers are fine     After your delivery - signs and symptoms to watch for:  Fever - Oral temperature greater than 100.4 degrees Fahrenheit  Foul-smelling vaginal discharge  Headache unrelieved by \"pain meds\"  Difficulty urinating  Breasts reddened, hard, hot to the touch  Nipple discharge which is foul-smelling or contains pus  Increased pain at the site of the episiotomy  Difficulty breathing with or without chest pain  New calf pain especially if only on one side  Sudden, continuing increased vaginal bleeding (heavier than a period) with or without clots  Unrelieved feelings of:   Inability to cope  Sadness  Anxiety  Lack of interest in baby  Insomnia  Crying     What to do at home:  Resume activity gradually   Don't lift anything heavier than baby and carrier until OK'd by your Physician   No sex until OK'd by your Physician  Eat regular nutritious meals  Take pain medication as prescribed whenever you need them  To avoid/relieve constipation take stool softeners if advised   Increase fiber in your diet    Most importantly ENJOY your Baby!  -     Please call immediately with Questions and Concerns - (Answering Service) after hours and weekends. By signing below, I understand that if any emergent problems occur, once I leave the hospital, I am to dial #[phone] or go immediately to the nearest Emergency Room. For less emergent matters,    can be reached by calling his Office or  answering service at the above numbers. I understand and acknowledge receipt of the instructions indicated above.

## 2023-01-31 NOTE — PROGRESS NOTES
CLINICAL PHARMACY NOTE: MEDS TO BEDS    Total # of Prescriptions Filled: 3   The following medications were delivered to the patient:  Ibu 600  Dox 100  Nicotine 7    Additional Documentation:

## 2023-01-31 NOTE — CARE COORDINATION
Peer Recovery Support Note    Name: Khris Correia  Date: 1/31/2023    Chief Complaint   Patient presents with    Contractions       Peer Support met with patient. [x] Support and education provided  [x] Resources provided   [x]Treatment referral: [facility]  [] Other:   [] Patient declined peer recovery services     Referred By:     Notes: Patient was discharged today and baby is in NICU. Patient would like to visit the baby daily and has a 10 year living with her and the father. Patient was referred to [facility], she left a voice with them to schedule appoint as soon as possible. Peer did let Jamari FRANKLIN-W[facility] nurse practitioner know that she was seen by peer and to follow up with her.     SignedLarry Posey, 1/31/2023

## 2023-01-31 NOTE — PROGRESS NOTES
PPD #2     Patient:  Va Ocasio     Admit Date:  1/29/2023  3:40 PM  Medical Record Number:  [id number]   Today's Date: 1/31/2023    S: No complaints; tolerating diet: yes -  ; ambulating well: yes - ; voiding without difficulty:  yes - ; bm: yes - loose; flatus: yes - ; pain meds appropriate: yes - ; vaginal bleeding: thin lochia.     O:   Vitals:    01/30/23 0830 01/30/23 1530 01/30/23 2300 01/31/23 0747   BP:  123/69 (!) 107/58 (!) 100/51   Pulse: 64 68 62 68   Resp:  16 16 16   Temp:  98.3 °F (36.8 °C) 97.8 °F (36.6 °C) 97.9 °F (36.6 °C)   TempSrc:  Oral Oral Oral   SpO2:  100% 98% 99%   Weight:       Height:         Gen: A&O, cooperative, pleasant   Heart: RRR   Lungs: CTA b/l   Abd; soft, NT, non distended, +BS  Back: no CVA or paraspinal tenderness   Ext: No clubbing, cyanosis, edema:no , no cords palpable, no calf tenderness   Neuro: intact   Uterus: firm, well contracted, nt   Perineum: intact, healing well   Yana pad: thin lochia    Lab Results   Component Value Date    HGB 10.8 (L) 01/31/2023    HCT 33.2 (L) 01/31/2023      Recent Results (from the past 72 hour(s))   CBC    Collection Time: 01/29/23  4:15 PM   Result Value Ref Range    WBC 26.5 (H) 4.5 - 11.5 E9/L    RBC 3.79 3.50 - 5.50 E12/L    Hemoglobin 11.8 11.5 - 15.5 g/dL    Hematocrit 35.4 34.0 - 48.0 %    MCV 93.4 80.0 - 99.9 fL    MCH 31.1 26.0 - 35.0 pg    MCHC 33.3 32.0 - 34.5 %    RDW 13.8 11.5 - 15.0 fL    Platelets 605 431 - 622 E9/L    MPV 9.3 7.0 - 12.0 fL   TYPE AND SCREEN    Collection Time: 01/29/23  4:15 PM   Result Value Ref Range    ABO/Rh A POS     Antibody Screen NEG    Urine Drug Screen    Collection Time: 01/29/23  4:15 PM   Result Value Ref Range    Amphetamine Screen, Urine NOT DETECTED Negative <1000 ng/mL    Barbiturate Screen, Ur NOT DETECTED Negative < 200 ng/mL    Benzodiazepine Screen, Urine NOT DETECTED Negative < 200 ng/mL    Cannabinoid Scrn, Ur POSITIVE (A) Negative < 50ng/mL    Cocaine Metabolite Screen, Urine NOT DETECTED Negative < 300 ng/mL    Opiate Scrn, Ur NOT DETECTED Negative < 300ng/mL    PCP Screen, Urine NOT DETECTED Negative < 25 ng/mL    Methadone Screen, Urine NOT DETECTED Negative <300 ng/mL    Oxycodone Urine NOT DETECTED Negative <100 ng/mL    FENTANYL SCREEN, URINE POSITIVE (A) Negative <1 ng/mL    Drug Screen Comment: see below    THC-COOH, Quantitative, Urine    Collection Time: 01/29/23  4:15 PM   Result Value Ref Range    THC-COOH, QUANTITATIVE, URINE 53.0 Cutoff 15 ng/mL    THC Normalized, Quantitative, Urine 36.0 Cutoff 15 ng/mL    Comment see below    Specimen Validity Panel    Collection Time: 01/29/23  4:15 PM   Result Value Ref Range    Integrity Check, Oxidant, Urine <40 < 200  mg/L    Integrity Check, pH, Urine 7.4 4.5 - 9.0    Integrity Check, Specific Gravity, Urine 1.017 1.002 - 1.030    Integrity Check, Creatinine, Urine 147.4 22 - 250 mg/dL    Integrity Check, Specimen Integrity, Urine see below    FENTANYL, Quantitative, Urine    Collection Time: 01/29/23  4:15 PM   Result Value Ref Range    Fentanyl, Urine, Quant 20.2 Cutoff 5 ng/mL    Norfentanyl, Urine, Quant >1,000.0 Cutoff 10 ng/mL   CBC with Auto Differential    Collection Time: 01/30/23  4:53 AM   Result Value Ref Range    WBC 32.7 (H) 4.5 - 11.5 E9/L    RBC 3.74 3.50 - 5.50 E12/L    Hemoglobin 11.4 (L) 11.5 - 15.5 g/dL    Hematocrit 35.3 34.0 - 48.0 %    MCV 94.4 80.0 - 99.9 fL    MCH 30.5 26.0 - 35.0 pg    MCHC 32.3 32.0 - 34.5 %    RDW 14.0 11.5 - 15.0 fL    Platelets 441 (H) 974 - 450 E9/L    MPV 9.4 7.0 - 12.0 fL    Neutrophils % 90.4 (H) 43.0 - 80.0 %    Immature Granulocytes % 1.3 0.0 - 5.0 %    Lymphocytes % 5.0 (L) 20.0 - 42.0 %    Monocytes % 3.1 2.0 - 12.0 %    Eosinophils % 0.0 0.0 - 6.0 %    Basophils % 0.2 0.0 - 2.0 %    Neutrophils Absolute 29.60 (H) 1.80 - 7.30 E9/L    Immature Granulocytes # 0.42 E9/L    Lymphocytes Absolute 1.62 1.50 - 4.00 E9/L    Monocytes Absolute 1.00 (H) 0.10 - 0.95 E9/L    Eosinophils Absolute 0.01 (L) 0.05 - 0.50 E9/L    Basophils Absolute 0.05 0.00 - 0.20 E9/L   Urinalysis    Collection Time: 23 10:13 AM   Result Value Ref Range    Color, UA Yellow Straw/Yellow    Clarity, UA Clear Clear    Glucose, Ur Negative Negative mg/dL    Bilirubin Urine Negative Negative    Ketones, Urine Negative Negative mg/dL    Specific Gravity, UA 1.015 1.005 - 1.030    Blood, Urine Negative Negative    pH, UA 6.5 5.0 - 9.0    Protein, UA Negative Negative mg/dL    Urobilinogen, Urine 0.2 <2.0 E.U./dL    Nitrite, Urine Negative Negative    Leukocyte Esterase, Urine Negative Negative   CBC with Auto Differential    Collection Time: 23  5:08 AM   Result Value Ref Range    WBC 19.8 (H) 4.5 - 11.5 E9/L    RBC 3.49 (L) 3.50 - 5.50 E12/L    Hemoglobin 10.8 (L) 11.5 - 15.5 g/dL    Hematocrit 33.2 (L) 34.0 - 48.0 %    MCV 95.1 80.0 - 99.9 fL    MCH 30.9 26.0 - 35.0 pg    MCHC 32.5 32.0 - 34.5 %    RDW 14.2 11.5 - 15.0 fL    Platelets 294 583 - 532 E9/L    MPV 9.6 7.0 - 12.0 fL    Neutrophils % 78.5 43.0 - 80.0 %    Immature Granulocytes % 1.3 0.0 - 5.0 %    Lymphocytes % 13.1 (L) 20.0 - 42.0 %    Monocytes % 5.8 2.0 - 12.0 %    Eosinophils % 1.0 0.0 - 6.0 %    Basophils % 0.3 0.0 - 2.0 %    Neutrophils Absolute 15.60 (H) 1.80 - 7.30 E9/L    Immature Granulocytes # 0.25 E9/L    Lymphocytes Absolute 2.60 1.50 - 4.00 E9/L    Monocytes Absolute 1.15 (H) 0.10 - 0.95 E9/L    Eosinophils Absolute 0.19 0.05 - 0.50 E9/L    Basophils Absolute 0.05 0.00 - 0.20 E9/L       A: 25 y.o. female  at 33w4d weeks  PPD #2 S/P ; Episiotomy was performed in the mediolateral region  Patient Active Problem List   Diagnosis    Late prenatal care affecting pregnancy in third trimester    Primigravida in third trimester    Smoker    33 4/7 weeks gestation of pregnancy    Marijuana use    Limited prenatal care in third trimester    Active  labor, single or unspecified fetus    Positive urine drug screen (cannabinoids and fentanyl)     (normal spontaneous vaginal delivery)     male  infant of 33 4/7 completed weeks of gestation    Marginal insertion of umbilical cord affecting management of mother in third trimester       P: Routine PP care. Discharge home with instructions, precautions. Prescription for Ibuprofen 600 mg. Continue PNV with Iron daily. Follow-up office 6 weeks for postpartum visit.     NOEMI Aguayo CNM    2023 8:07 AM

## 2023-02-01 LAB — URINE CULTURE, ROUTINE: NORMAL

## 2023-02-01 NOTE — CARE COORDINATION
SW and peer Selin Moreno, spoke with pt and educated on the program Maureen. Pt agreeable to program and will complete initial intake assessment with SW during next appointment. Due to the circumstances of pt being scheduled, SW unable to complete assessment today. Pt was provided contact information for peer support and . Pt encouraged to reach out to staff if needed. This is a non-billable service.

## 2023-02-04 LAB — BLOOD CULTURE, ROUTINE: NORMAL

## 2023-02-15 ENCOUNTER — HOSPITAL ENCOUNTER (OUTPATIENT)
Dept: PSYCHIATRY | Age: 23
Setting detail: THERAPIES SERIES
Discharge: HOME | End: 2023-02-15
Payer: MEDICAID

## 2023-02-15 ENCOUNTER — HOSPITAL ENCOUNTER (OUTPATIENT)
Age: 23
Discharge: HOME | End: 2023-02-15
Payer: MEDICAID

## 2023-02-15 DIAGNOSIS — F11.20 OPIOID DEPENDENCE ON AGONIST THERAPY (HCC): Primary | ICD-10-CM

## 2023-02-15 LAB
AMPHETAMINE SCREEN, URINE: NOT DETECTED
BARBITURATE SCREEN URINE: NOT DETECTED
BENZODIAZEPINE SCREEN, URINE: NOT DETECTED
BUPRENORPHINE URINE: POSITIVE
CANNABINOID SCREEN URINE: NOT DETECTED
COCAINE METABOLITE SCREEN URINE: NOT DETECTED
FENTANYL SCREEN, URINE: NOT DETECTED
Lab: ABNORMAL
METHADONE SCREEN, URINE: NOT DETECTED
OPIATE SCREEN URINE: NOT DETECTED
OXYCODONE URINE: NOT DETECTED
PHENCYCLIDINE SCREEN URINE: NOT DETECTED

## 2023-02-15 PROCEDURE — 80307 DRUG TEST PRSMV CHEM ANLYZR: CPT

## 2023-02-15 PROCEDURE — 90791 PSYCH DIAGNOSTIC EVALUATION: CPT

## 2023-02-15 RX ORDER — BUPRENORPHINE AND NALOXONE 8; 2 MG/1; MG/1
2.5 FILM, SOLUBLE BUCCAL; SUBLINGUAL DAILY
Qty: 40 FILM | Refills: 0 | Status: SHIPPED | OUTPATIENT
Start: 2023-02-16 | End: 2023-03-04

## 2023-02-15 ASSESSMENT — ANXIETY QUESTIONNAIRES
3. WORRYING TOO MUCH ABOUT DIFFERENT THINGS: 0
IF YOU CHECKED OFF ANY PROBLEMS ON THIS QUESTIONNAIRE, HOW DIFFICULT HAVE THESE PROBLEMS MADE IT FOR YOU TO DO YOUR WORK, TAKE CARE OF THINGS AT HOME, OR GET ALONG WITH OTHER PEOPLE: NOT DIFFICULT AT ALL
1. FEELING NERVOUS, ANXIOUS, OR ON EDGE: 0
6. BECOMING EASILY ANNOYED OR IRRITABLE: 0
2. NOT BEING ABLE TO STOP OR CONTROL WORRYING: 0
5. BEING SO RESTLESS THAT IT IS HARD TO SIT STILL: 0
4. TROUBLE RELAXING: 0
GAD7 TOTAL SCORE: 0
7. FEELING AFRAID AS IF SOMETHING AWFUL MIGHT HAPPEN: 0
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
## 2023-03-03 ENCOUNTER — HOSPITAL ENCOUNTER (OUTPATIENT)
Dept: PSYCHIATRY | Age: 23
Setting detail: THERAPIES SERIES
Discharge: HOME | End: 2023-03-03
Payer: MEDICAID

## 2023-03-03 ENCOUNTER — HOSPITAL ENCOUNTER (OUTPATIENT)
Age: 23
Discharge: HOME | End: 2023-03-03
Payer: MEDICAID

## 2023-03-03 DIAGNOSIS — F11.20 OPIOID DEPENDENCE ON AGONIST THERAPY (HCC): Primary | ICD-10-CM

## 2023-03-03 PROCEDURE — 99214 OFFICE O/P EST MOD 30 MIN: CPT | Performed by: PSYCHIATRY & NEUROLOGY

## 2023-03-03 PROCEDURE — 80307 DRUG TEST PRSMV CHEM ANLYZR: CPT

## 2023-03-03 PROCEDURE — 90834 PSYTX W PT 45 MINUTES: CPT

## 2023-03-03 RX ORDER — BUPRENORPHINE AND NALOXONE 8; 2 MG/1; MG/1
1 FILM, SOLUBLE BUCCAL; SUBLINGUAL 3 TIMES DAILY
Qty: 42 FILM | Refills: 0 | Status: SHIPPED | OUTPATIENT
Start: 2023-03-03 | End: 2023-03-17
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
## 2023-03-03 NOTE — CARE COORDINATION
Individual Therapy Note     Date: 3/3/2023  Start Time: 1:00PM  End Time: 1:45PM     SW met with pt and pt's community health worker, Kimberley Laguna. Pt has baby present during session. Pt was cooperative and forthcoming during session. Pt was alert and oriented x4. Pt's mood appeared calm during session. Pt presented with appropriate thought content. Pt denied SI/HI/AVH. Pt shared she has been \"doing good\" over the past two weeks and has made progress in finding resources for herself and her baby. Pt reported she successfully signed up for [facility] services and this has been helpful. Pt reported services through [facility] has provided pt with several resources and working with the community health worker to secure housing. Pt reported housing is her main stressor currently. Pt reported she is living with her Aunt, however is unable to sleep in the house due to the [facility] safety plan. Pt shared she is sleeping in her car during the night at her aunts house. Pt shared she has discussed this with the [address] [place] [address]  and she is aware of the situation. Pt shared she is looking forward to the [address] [place] [address]  removing limitations - in regards to pt's access to her child. Pt shared she is proud of herself for the progress she has made during the past month. Pt shared she spoke with Dr. Marva Stratton regarding cravings and medications. Pt denied all cravings of substances (fentanyl). Pt shared she has not attended any NA/AA/HA meetings. SW encouraged pt to attend meetings, even if the meeting is virtual. SW to discuss with peer support about providing pt with additional resources for sober meetings. Pt provided verbal consent to have community health worker present during session. SW to have pt sign DEVAN.

## 2023-03-15 ENCOUNTER — OFFICE VISIT (OUTPATIENT)
Age: 23
End: 2023-03-15
Payer: MEDICAID

## 2023-03-15 ENCOUNTER — HOSPITAL ENCOUNTER (OUTPATIENT)
Dept: PSYCHIATRY | Age: 23
Setting detail: THERAPIES SERIES
Discharge: HOME | End: 2023-03-15

## 2023-03-15 DIAGNOSIS — F11.20 OPIOID DEPENDENCE ON AGONIST THERAPY (HCC): Primary | ICD-10-CM

## 2023-03-15 DIAGNOSIS — F11.20 OPIOID DEPENDENCE ON AGONIST THERAPY (HCC): ICD-10-CM

## 2023-03-15 PROCEDURE — 99214 OFFICE O/P EST MOD 30 MIN: CPT | Performed by: PSYCHIATRY & NEUROLOGY

## 2023-03-15 RX ORDER — BUPRENORPHINE AND NALOXONE 8; 2 MG/1; MG/1
1 FILM, SOLUBLE BUCCAL; SUBLINGUAL 3 TIMES DAILY
Qty: 42 FILM | Refills: 0 | Status: SHIPPED | OUTPATIENT
Start: 2023-03-17 | End: 2023-03-31
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
## 2023-03-16 LAB
AMPHET UR QL SCN: NOT DETECTED
BARBITURATES UR QL SCN: NOT DETECTED
BENZODIAZ UR QL SCN: NOT DETECTED
BUPRENORPHINE URINE: POSITIVE
CANNABINOIDS UR QL SCN: POSITIVE
COCAINE UR QL SCN: NOT DETECTED
DRUG SCREEN COMMENT UR-IMP: ABNORMAL
FENTANYL SCREEN, URINE: NOT DETECTED
METHADONE UR QL SCN: NOT DETECTED
OPIATES UR QL SCN: NOT DETECTED
OXYCODONE URINE: NOT DETECTED
PCP UR QL SCN: NOT DETECTED

## 2023-03-22 NOTE — CARE COORDINATION
Individual Therapy Note     Date: 3/15/2023  Start Time: 12:30 PM  End Time: 1:30 PM    SW met with pt following appt with Dr. Kannan England. Mental Status Exam - Pt presented alert and oriented x4. Pt was pleasant and forthcoming with information. Pts mood appeared anxious. Pt had difficulty sitting through appointment and paced throughout office. Pt present with tangential thought process, clear speech. Pt denied SI/HI/AVH. Individual Session Notes - Pt reported she is doing well. Pt shared her main stressor is currently housing and transportation. Discussed housing progress with pt. Pt continues to follow with the CHW through [facility]. Pt shared she is staying at her boyfriends aunts house. Pt shared she has had minimal contact with her boyfriend (1x per week) due to his work schedule. Pt shared her boyfriend has been struggling with his sobriety. Pt shared she has set boundaries with him and he is not staying at the house with her. Continued to discuss boundaries within relationships. Pt shared her mother has been contacting her frequently. Pt shared her mother is currently inpatient for psychiatric reasons. Pt shared her mother has a hx of bipolar. Pt shared she has been setting boundaries with her mother and only answers her phone calls when she is emotionally able to. Pt shared her mother contacts her several times per day. Pt shared she has been aware of her emotions and the impact her mother has on her mental health. Client shared her visits with children services has continues weekly. Client shared she has a positive rapport with her , Marquez Lima. KELLY has been in contact with Marquez Lima to provide bi-weekly updates. Pt shared her main goal is to schedule an appointment with an OBGYN within the next two weeks. SW provided pt with resources within the community. Pt shared she wanted to contact the office herself to schedule an appointment.  SW to follow up with pt in 2

## 2023-03-29 ENCOUNTER — OFFICE VISIT (OUTPATIENT)
Age: 23
End: 2023-03-29
Payer: MEDICAID

## 2023-03-29 DIAGNOSIS — F11.20 OPIOID DEPENDENCE ON AGONIST THERAPY (HCC): Primary | ICD-10-CM

## 2023-03-29 DIAGNOSIS — F11.20 OPIOID DEPENDENCE ON AGONIST THERAPY (HCC): ICD-10-CM

## 2023-03-29 PROCEDURE — 99214 OFFICE O/P EST MOD 30 MIN: CPT | Performed by: PSYCHIATRY & NEUROLOGY

## 2023-03-29 PROCEDURE — G8428 CUR MEDS NOT DOCUMENT: HCPCS | Performed by: PSYCHIATRY & NEUROLOGY

## 2023-03-29 PROCEDURE — G8484 FLU IMMUNIZE NO ADMIN: HCPCS | Performed by: PSYCHIATRY & NEUROLOGY

## 2023-03-29 PROCEDURE — G8419 CALC BMI OUT NRM PARAM NOF/U: HCPCS | Performed by: PSYCHIATRY & NEUROLOGY

## 2023-03-29 PROCEDURE — 4004F PT TOBACCO SCREEN RCVD TLK: CPT | Performed by: PSYCHIATRY & NEUROLOGY

## 2023-03-29 RX ORDER — BUPRENORPHINE AND NALOXONE 8; 2 MG/1; MG/1
1 FILM, SOLUBLE BUCCAL; SUBLINGUAL 3 TIMES DAILY
Qty: 42 FILM | Refills: 0 | Status: SHIPPED | OUTPATIENT
Start: 2023-03-31 | End: 2023-04-14

## 2023-03-29 NOTE — PROGRESS NOTES
formula Infamil and struggle she has had with this. [employer] is good. Says she is close to getting an apartment. Admits father of the baby is struggling. Only sees 1x week. Mother is in psychiatric brown     Current HX  Reports she has not been sleep  She did find formula. Baby is eating well up to 9lb 5 oz    We discussed her boyfriend is using Tramadol. He is on methadone program  He has not screened clean. She complains that she had developed an ulcer on the septum of her nose from inhaling drugs   It has gotten smaller. When she awakens she is clogged     Denies sig cravings. No report of clear med side effects                 Meds   buprenorphine-naloxone (SUBOXONE) 8-2 MG FILM SL film Place 1 Film under the tongue 3 times daily for 14 days.  Max Daily Amount: 3 Film    ibuprofen (ADVIL;MOTRIN) 600 MG tablet Take 1 tablet by mouth every 6 hours as needed for Pain           PDMP   03/19/2023  03/15/2023   1  Buprenorphine-Nalox 8-2mg Film 42.00  14  Kisha Bharati  [id number]   Rit (1984)  0  24.00 mg  Medicaid [place]     03/03/2023 03/03/2023   1  Buprenorphine-Nalox 8-2mg Film 42.00  14  Kisha Ferndale  [id number]   Rit (1984)  0  24.00 mg  Medicaid [place]     02/16/2023  02/15/2023   1  Buprenorphine-Nalox 8-2mg Film 40.00  16  Kisha Bharati  [id number]   Rit (1984)  0  20.00 mg  Medicaid [place]     02/14/2023 02/14/2023   1  Buprenorphine-Nalox 8-2mg Film 5.00  2  Kisha Ferndale  [id number]   Rit (1984)  0  20.00 mg  [place]icaid  [place]      Labs  Component Ref Range & Units 3/15/23 1230 3/3/23 1440 2/15/23 1408 1/29/23 1615   Amphetamine Screen, Urine Negative <1000 ng/mL NOT DETECTED  NOT DETECTED  NOT DETECTED  NOT DETECTED    Barbiturate Screen, Ur Negative < 200 ng/mL NOT DETECTED  NOT DETECTED  NOT DETECTED  NOT DETECTED    Benzodiazepine Screen, Urine Negative < 200 ng/mL NOT DETECTED  NOT DETECTED  NOT DETECTED  NOT DETECTED    Cannabinoid Scrn, Ur Negative < 50ng/mL POSITIVE Abnormal   NOT DETECTED  NOT DETECTED  POSITIVE Abnormal

## 2023-03-30 LAB
INTEGRITY CHECK, CREATININE, URINE: 121.6
INTEGRITY CHECK, OXIDANT, URINE: 48
INTEGRITY CHECK, PH, URINE: 5.3 (ref 4.5–9)
INTEGRITY CHECK, SPECIFIC GRAVITY, URINE: 1.02 (ref 1–1.03)
INTEGRITY CHECK, SPECIMEN INTEGRITY, URINE: NORMAL

## 2023-03-31 LAB
BUPRENORPHINE, QUANTITATIVE, URINE: 75.5
COMMENT: NORMAL
NORBUPRENORPHINE, QUANTITATIVE, URINE: 364.6
THC NORMALIZED, QUANTITIATIVE, URINE: 85.1
THC-COOH, QUANTITATIVE, URINE: 103.5

## 2023-04-12 ENCOUNTER — OFFICE VISIT (OUTPATIENT)
Dept: FAMILY MEDICINE CLINIC | Age: 23
End: 2023-04-12
Payer: MEDICAID

## 2023-04-12 VITALS
TEMPERATURE: 98.1 F | OXYGEN SATURATION: 98 % | SYSTOLIC BLOOD PRESSURE: 100 MMHG | WEIGHT: 149 LBS | RESPIRATION RATE: 16 BRPM | HEIGHT: 65 IN | DIASTOLIC BLOOD PRESSURE: 60 MMHG | BODY MASS INDEX: 24.83 KG/M2 | HEART RATE: 59 BPM

## 2023-04-12 DIAGNOSIS — Z87.898 HISTORY OF SUBSTANCE USE: ICD-10-CM

## 2023-04-12 DIAGNOSIS — N30.90 CYSTITIS: Primary | ICD-10-CM

## 2023-04-12 DIAGNOSIS — K21.9 GASTROESOPHAGEAL REFLUX DISEASE WITHOUT ESOPHAGITIS: ICD-10-CM

## 2023-04-12 PROCEDURE — G8427 DOCREV CUR MEDS BY ELIG CLIN: HCPCS

## 2023-04-12 PROCEDURE — 81000 URINALYSIS NONAUTO W/SCOPE: CPT

## 2023-04-12 PROCEDURE — 4004F PT TOBACCO SCREEN RCVD TLK: CPT

## 2023-04-12 PROCEDURE — 99203 OFFICE O/P NEW LOW 30 MIN: CPT

## 2023-04-12 PROCEDURE — G8420 CALC BMI NORM PARAMETERS: HCPCS

## 2023-04-12 RX ORDER — FAMOTIDINE 20 MG/1
20 TABLET, FILM COATED ORAL 2 TIMES DAILY
Qty: 60 TABLET | Refills: 3 | Status: SHIPPED | OUTPATIENT
Start: 2023-04-12

## 2023-04-12 RX ORDER — NITROFURANTOIN 25; 75 MG/1; MG/1
100 CAPSULE ORAL 2 TIMES DAILY
Qty: 10 CAPSULE | Refills: 0 | Status: SHIPPED | OUTPATIENT
Start: 2023-04-12 | End: 2023-04-17

## 2023-04-12 ASSESSMENT — PATIENT HEALTH QUESTIONNAIRE - PHQ9
SUM OF ALL RESPONSES TO PHQ QUESTIONS 1-9: 0
SUM OF ALL RESPONSES TO PHQ9 QUESTIONS 1 & 2: 0
SUM OF ALL RESPONSES TO PHQ QUESTIONS 1-9: 0
2. FEELING DOWN, DEPRESSED OR HOPELESS: 0
SUM OF ALL RESPONSES TO PHQ QUESTIONS 1-9: 0
SUM OF ALL RESPONSES TO PHQ QUESTIONS 1-9: 0
1. LITTLE INTEREST OR PLEASURE IN DOING THINGS: 0

## 2023-04-20 ENCOUNTER — TELEPHONE (OUTPATIENT)
Dept: FAMILY MEDICINE CLINIC | Age: 23
End: 2023-04-20

## 2023-04-26 ENCOUNTER — OFFICE VISIT (OUTPATIENT)
Age: 23
End: 2023-04-26
Payer: MEDICAID

## 2023-04-26 ENCOUNTER — OFFICE VISIT (OUTPATIENT)
Dept: FAMILY MEDICINE CLINIC | Age: 23
End: 2023-04-26
Payer: MEDICAID

## 2023-04-26 VITALS
RESPIRATION RATE: 16 BRPM | SYSTOLIC BLOOD PRESSURE: 100 MMHG | DIASTOLIC BLOOD PRESSURE: 63 MMHG | HEART RATE: 82 BPM | OXYGEN SATURATION: 96 % | TEMPERATURE: 97.4 F

## 2023-04-26 DIAGNOSIS — N89.8 VAGINAL ITCHING: ICD-10-CM

## 2023-04-26 DIAGNOSIS — N89.8 VAGINAL ITCHING: Primary | ICD-10-CM

## 2023-04-26 DIAGNOSIS — F11.20 OPIOID DEPENDENCE ON AGONIST THERAPY (HCC): Primary | ICD-10-CM

## 2023-04-26 DIAGNOSIS — F11.20 OPIOID DEPENDENCE ON AGONIST THERAPY (HCC): ICD-10-CM

## 2023-04-26 DIAGNOSIS — L81.4 SOLAR LENTIGINOSIS: ICD-10-CM

## 2023-04-26 DIAGNOSIS — J34.89 NASAL SEPTAL PERFORATION: ICD-10-CM

## 2023-04-26 LAB
AMPHET UR QL SCN: NOT DETECTED
BACTERIA URNS QL MICRO: ABNORMAL /HPF
BARBITURATES UR QL SCN: NOT DETECTED
BENZODIAZ UR QL SCN: NOT DETECTED
BILIRUB UR QL STRIP: NEGATIVE
BILIRUBIN, POC: NEGATIVE
BLOOD URINE, POC: NEGATIVE
BUPRENORPHINE URINE: POSITIVE
CANNABINOIDS UR QL SCN: POSITIVE
CLARITY UR: CLEAR
CLARITY, POC: CLEAR
COCAINE UR QL SCN: NOT DETECTED
COLOR UR: YELLOW
COLOR, POC: YELLOW
DRUG SCREEN COMMENT UR-IMP: ABNORMAL
EPI CELLS #/AREA URNS HPF: ABNORMAL /HPF
FENTANYL SCREEN, URINE: NOT DETECTED
GLUCOSE UR STRIP-MCNC: NEGATIVE MG/DL
GLUCOSE URINE, POC: NEGATIVE
HGB UR QL STRIP: NEGATIVE
KETONES UR STRIP-MCNC: NEGATIVE MG/DL
KETONES, POC: NEGATIVE
LEUKOCYTE EST, POC: NORMAL
LEUKOCYTE ESTERASE UR QL STRIP: ABNORMAL
METHADONE UR QL SCN: NOT DETECTED
NITRITE UR QL STRIP: NEGATIVE
NITRITE, POC: NEGATIVE
OPIATES UR QL SCN: NOT DETECTED
OXYCODONE URINE: NOT DETECTED
PCP UR QL SCN: NOT DETECTED
PH UR STRIP: 6 [PH] (ref 5–9)
PH, POC: 5.5
PROT UR STRIP-MCNC: NEGATIVE MG/DL
PROTEIN, POC: NEGATIVE
RBC #/AREA URNS HPF: ABNORMAL /HPF (ref 0–2)
SP GR UR STRIP: >=1.03 (ref 1–1.03)
SPECIFIC GRAVITY, POC: >=1.03
UROBILINOGEN UR STRIP-ACNC: 0.2 E.U./DL
UROBILINOGEN, POC: 0.2
WBC #/AREA URNS HPF: >20 /HPF (ref 0–5)

## 2023-04-26 PROCEDURE — 4004F PT TOBACCO SCREEN RCVD TLK: CPT | Performed by: PSYCHIATRY & NEUROLOGY

## 2023-04-26 PROCEDURE — G8428 CUR MEDS NOT DOCUMENT: HCPCS | Performed by: PSYCHIATRY & NEUROLOGY

## 2023-04-26 PROCEDURE — 99214 OFFICE O/P EST MOD 30 MIN: CPT | Performed by: PSYCHIATRY & NEUROLOGY

## 2023-04-26 PROCEDURE — 81002 URINALYSIS NONAUTO W/O SCOPE: CPT

## 2023-04-26 PROCEDURE — G8427 DOCREV CUR MEDS BY ELIG CLIN: HCPCS

## 2023-04-26 PROCEDURE — 99213 OFFICE O/P EST LOW 20 MIN: CPT

## 2023-04-26 PROCEDURE — G8420 CALC BMI NORM PARAMETERS: HCPCS

## 2023-04-26 PROCEDURE — 4004F PT TOBACCO SCREEN RCVD TLK: CPT

## 2023-04-26 PROCEDURE — G8420 CALC BMI NORM PARAMETERS: HCPCS | Performed by: PSYCHIATRY & NEUROLOGY

## 2023-04-26 RX ORDER — BUPRENORPHINE AND NALOXONE 8; 2 MG/1; MG/1
1 FILM, SOLUBLE BUCCAL; SUBLINGUAL 3 TIMES DAILY
Qty: 42 FILM | Refills: 0 | Status: SHIPPED
Start: 2023-04-29 | End: 2023-04-26 | Stop reason: SDUPTHER

## 2023-04-26 SDOH — ECONOMIC STABILITY: HOUSING INSECURITY
IN THE LAST 12 MONTHS, WAS THERE A TIME WHEN YOU DID NOT HAVE A STEADY PLACE TO SLEEP OR SLEPT IN A SHELTER (INCLUDING NOW)?: YES

## 2023-04-26 SDOH — ECONOMIC STABILITY: FOOD INSECURITY: WITHIN THE PAST 12 MONTHS, THE FOOD YOU BOUGHT JUST DIDN'T LAST AND YOU DIDN'T HAVE MONEY TO GET MORE.: SOMETIMES TRUE

## 2023-04-26 SDOH — ECONOMIC STABILITY: INCOME INSECURITY: HOW HARD IS IT FOR YOU TO PAY FOR THE VERY BASICS LIKE FOOD, HOUSING, MEDICAL CARE, AND HEATING?: VERY HARD

## 2023-04-26 SDOH — ECONOMIC STABILITY: FOOD INSECURITY: WITHIN THE PAST 12 MONTHS, YOU WORRIED THAT YOUR FOOD WOULD RUN OUT BEFORE YOU GOT MONEY TO BUY MORE.: SOMETIMES TRUE

## 2023-04-26 NOTE — PROGRESS NOTES
S: 25 y.o. female here for UTI f/u. Took macrobid for klebsiella. Still some vaginal itching when wiping. No discharge. Seems to be improving. GCCT neg. Skin lesions 2/2 h/o drug use. H/o nasal septal perf 2/2 drug use in past. Crusting in nose in AM limiting breathing through nose. O: VS: /63   Pulse 82   Temp 97.4 °F (36.3 °C) (Temporal)   Resp 16   SpO2 96%    General: NAD, alert and interacting appropriately. No impetigo noted in nasal septum. Skin: per media images. Flat, possibly petechiae   CV:  RRR, no gallops, rubs, or murmurs    Resp: CTAB      Impression: UTI f/u. Skin lesions. Nasal septal perforation. H/o drug use. Plan:   UA  Derm referral  ENT referral  Cont suboxone. Attending Physician Statement  I have discussed the case, including pertinent history and exam findings with the resident. I agree with the documented assessment and plan.
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
## 2023-04-27 LAB
INTEGRITY CHECK, CREATININE, URINE: 175.1
INTEGRITY CHECK, OXIDANT, URINE: <40
INTEGRITY CHECK, PH, URINE: 6.1 (ref 4.5–9)
INTEGRITY CHECK, SPECIFIC GRAVITY, URINE: 1.02 (ref 1–1.03)
INTEGRITY CHECK, SPECIMEN INTEGRITY, URINE: NORMAL

## 2023-04-27 RX ORDER — AMOXICILLIN AND CLAVULANATE POTASSIUM 500; 125 MG/1; MG/1
1 TABLET, FILM COATED ORAL 2 TIMES DAILY
Qty: 14 TABLET | Refills: 0 | Status: SHIPPED | OUTPATIENT
Start: 2023-04-27 | End: 2023-05-04

## 2023-04-27 NOTE — PROGRESS NOTES
[facility] MEDICINE RESIDENCY PROGRAM  DATE OF VISIT : 2023    Patient : Joana Gonzalez   Age : 25 y.o.  : 2000   MRN : [id number]   ________________________________________________________________    Chief Complaint:   Chief Complaint   Patient presents with    Vaginal Itching     Follow-up  better     Facial Injury     Gets scabs in nares  needs  treatment        HPI:   History obtained from the patient. Joana Gonzalez is a 25 y.o. female here for UTI f/u. Ordered and finished Macrobid as prescribed. Urine culture grew klebsiella A. States that symptoms mostly resolved except for some vaginal itching. Still some vaginal itching when wiping. No discharge. Seems to be improving. Skin lesions -  History of IV drugs use in the past. States this lesions appeared after using injections. Would like a dermatologist referral for further care. Nasal septal perforation  Chronic. States that occurred during her past when she used drugs in the past.  Currently complaining of crusting in nose in AM limiting breathing through nose. No fevers, discharge , pain. O: VS: /63   Pulse 82   Temp 97.4 °F (36.3 °C) (Temporal)   Resp 16   SpO2 96%               General: NAD, alert and interacting appropriately. No impetigo noted in nasal septum. Skin: per media images. Flat, possibly petechiae              CV:  RRR, no gallops, rubs, or murmurs               Resp: CTAB        Assessment & Plan:  1. Vaginal itching  Repeat UA. Augmentin if again positive. - POCT Urinalysis no Micro  - Urinalysis; Future  - Culture, Urine; Future    2. Nasal septal perforation  - Annie Collazo., DO, Otolaryngology, [place]    3. Solar lentiginosis  Advised on using Sunblock for now.  Referral made to dermatologist.  - Sherine Bell, Dermatology, [place] ([place])        Educational materials printed for patient's review and were

## 2023-04-27 NOTE — RESULT ENCOUNTER NOTE
Called patient to discuss results. Augmentin 500mg was sent to her pharmacy to be taken twice a day for the next 7 days. All questions were answered. She will return to clinic or to the ED if no improvement of symptoms.

## 2023-04-28 LAB
BACTERIA UR CULT: NORMAL
BUPRENORPHINE, QUANTITATIVE, URINE: 105.1
COMMENT: NORMAL
NALOXONE, QUANTITATIVE, URINE: 127.1
NORBUPRENORPHINE, QUANTITATIVE, URINE: 232.7
THC NORMALIZED, QUANTITIATIVE, URINE: 121.1
THC-COOH, QUANTITATIVE, URINE: 212

## 2023-05-10 DIAGNOSIS — F11.20 OPIOID DEPENDENCE ON AGONIST THERAPY (HCC): ICD-10-CM

## 2023-05-10 DIAGNOSIS — F11.20 OPIOID DEPENDENCE ON AGONIST THERAPY (HCC): Primary | ICD-10-CM

## 2023-05-11 ENCOUNTER — OFFICE VISIT (OUTPATIENT)
Dept: OBGYN CLINIC | Age: 23
End: 2023-05-11
Payer: MEDICAID

## 2023-05-11 VITALS — WEIGHT: 149 LBS | HEIGHT: 65 IN | BODY MASS INDEX: 24.83 KG/M2 | HEART RATE: 66 BPM

## 2023-05-11 DIAGNOSIS — N76.0 ACUTE VAGINITIS: ICD-10-CM

## 2023-05-11 PROBLEM — Z3A.33 33 WEEKS GESTATION OF PREGNANCY: Status: RESOLVED | Noted: 2023-01-27 | Resolved: 2023-05-11

## 2023-05-11 LAB
INTEGRITY CHECK, CREATININE, URINE: 222.8
INTEGRITY CHECK, OXIDANT, URINE: <40
INTEGRITY CHECK, PH, URINE: 5.3 (ref 4.5–9)
INTEGRITY CHECK, SPECIFIC GRAVITY, URINE: 1.03 (ref 1–1.03)
INTEGRITY CHECK, SPECIMEN INTEGRITY, URINE: ABNORMAL

## 2023-05-11 PROCEDURE — G8427 DOCREV CUR MEDS BY ELIG CLIN: HCPCS | Performed by: OBSTETRICS & GYNECOLOGY

## 2023-05-11 PROCEDURE — 99213 OFFICE O/P EST LOW 20 MIN: CPT | Performed by: OBSTETRICS & GYNECOLOGY

## 2023-05-11 PROCEDURE — G8420 CALC BMI NORM PARAMETERS: HCPCS | Performed by: OBSTETRICS & GYNECOLOGY

## 2023-05-11 PROCEDURE — 4004F PT TOBACCO SCREEN RCVD TLK: CPT | Performed by: OBSTETRICS & GYNECOLOGY

## 2023-05-11 PROCEDURE — 99204 OFFICE O/P NEW MOD 45 MIN: CPT | Performed by: OBSTETRICS & GYNECOLOGY

## 2023-05-11 ASSESSMENT — ENCOUNTER SYMPTOMS
BLOOD IN STOOL: 0
WHEEZING: 0
COUGH: 0
CONSTIPATION: 0
DIARRHEA: 0
SHORTNESS OF BREATH: 0
ABDOMINAL PAIN: 0
NAUSEA: 0
VOMITING: 0

## 2023-05-11 NOTE — PROGRESS NOTES
Post partum visit pt is currently on period. LMP 05/11/23 no family planning method at this time. Pt is currently bottle feeding.   Last pap 11/28/22 negative
Sexually Active  Yes Partners  Male                     Review of Systems   Constitutional:  Negative for fever. HENT:  Negative for hearing loss. Eyes:  Negative for visual disturbance. Respiratory:  Negative for cough, shortness of breath and wheezing. Cardiovascular:  Negative for chest pain and palpitations. Gastrointestinal:  Negative for abdominal pain, blood in stool, constipation, diarrhea, nausea and vomiting. Genitourinary:  Positive for vaginal discharge. Negative for dysuria, enuresis, frequency, hematuria and urgency. Musculoskeletal:  Negative for arthralgias and myalgias. Skin:  Negative for rash. Neurological:  Negative for headaches. Hematological:  Does not bruise/bleed easily. Psychiatric/Behavioral:  Negative for agitation, dysphoric mood, self-injury, sleep disturbance and suicidal ideas. The patient is not nervous/anxious. Vitals:    05/11/23 0946   Pulse: 66        Physical Exam  Genitourinary:      Bladder and urethral meatus normal.      Right Labia: No rash or lesions. Left Labia: No lesions or rash. No vaginal prolapse present. No vaginal atrophy present. Right Adnexa: not tender and no mass present. Left Adnexa: not tender and no mass present. Cervical lesion present. No cervical motion tenderness. Uterus is not enlarged or tender. No uterine mass detected. Levator ani not tender and obturator internus not tender. Neurological:      Mental Status: She is alert. Skin:     General: Skin is warm. Findings: Lesion present. Comments: Multiple healed lesions on arms legs and torso   Psychiatric:         Mood and Affect: Mood normal.        Dionna was seen today for postpartum care. Diagnoses and all orders for this visit:    6 weeks postpartum follow-up    Acute vaginitis  -     C.trachomatis N.gonorrhoeae DNA;  Future  -     Culture, Genital; Future      We reviewed signs and symptoms of postpartum

## 2023-05-12 LAB
BUPRENORPHINE, QUANTITATIVE, URINE: 712.3
COMMENT: NORMAL
NORBUPRENORPHINE, QUANTITATIVE, URINE: 635.8
THC NORMALIZED, QUANTITIATIVE, URINE: 53.8
THC-COOH, QUANTITATIVE, URINE: 119.9

## 2023-05-15 LAB
BACTERIA GENITAL AEROBE CULT: ABNORMAL
C TRACH DNA SPEC QL NAA+PROBE: NEGATIVE
N GONORRHOEA DNA SPEC QL NAA+PROBE: NEGATIVE
ORGANISM: ABNORMAL
SPECIMEN SOURCE: NORMAL

## 2023-05-24 ENCOUNTER — OFFICE VISIT (OUTPATIENT)
Age: 23
End: 2023-05-24
Payer: MEDICAID

## 2023-05-24 DIAGNOSIS — F11.20 OPIOID DEPENDENCE ON AGONIST THERAPY (HCC): Primary | ICD-10-CM

## 2023-05-24 PROCEDURE — 4004F PT TOBACCO SCREEN RCVD TLK: CPT | Performed by: PSYCHIATRY & NEUROLOGY

## 2023-05-24 PROCEDURE — G8427 DOCREV CUR MEDS BY ELIG CLIN: HCPCS | Performed by: PSYCHIATRY & NEUROLOGY

## 2023-05-24 PROCEDURE — 99214 OFFICE O/P EST MOD 30 MIN: CPT | Performed by: PSYCHIATRY & NEUROLOGY

## 2023-05-24 PROCEDURE — G8420 CALC BMI NORM PARAMETERS: HCPCS | Performed by: PSYCHIATRY & NEUROLOGY

## 2023-05-24 RX ORDER — BUPRENORPHINE AND NALOXONE 8; 2 MG/1; MG/1
1 FILM, SOLUBLE BUCCAL; SUBLINGUAL 2 TIMES DAILY
Qty: 56 FILM | Refills: 0 | Status: SHIPPED | OUTPATIENT
Start: 2023-05-25 | End: 2023-06-22
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
## 2023-06-21 ENCOUNTER — OFFICE VISIT (OUTPATIENT)
Age: 23
End: 2023-06-21
Payer: MEDICAID

## 2023-06-21 ENCOUNTER — OFFICE VISIT (OUTPATIENT)
Dept: FAMILY MEDICINE CLINIC | Age: 23
End: 2023-06-21
Payer: MEDICAID

## 2023-06-21 ENCOUNTER — OFFICE VISIT (OUTPATIENT)
Age: 23
End: 2023-06-21

## 2023-06-21 VITALS
HEART RATE: 92 BPM | SYSTOLIC BLOOD PRESSURE: 102 MMHG | OXYGEN SATURATION: 95 % | WEIGHT: 143 LBS | TEMPERATURE: 98.8 F | RESPIRATION RATE: 18 BRPM | BODY MASS INDEX: 23.82 KG/M2 | HEIGHT: 65 IN | DIASTOLIC BLOOD PRESSURE: 69 MMHG

## 2023-06-21 DIAGNOSIS — F11.20 OPIOID DEPENDENCE ON AGONIST THERAPY (HCC): Primary | ICD-10-CM

## 2023-06-21 DIAGNOSIS — F11.20 OPIOID DEPENDENCE ON MAINTENANCE AGONIST THERAPY, NO SYMPTOMS (HCC): Primary | ICD-10-CM

## 2023-06-21 DIAGNOSIS — N89.8 VAGINAL DISCHARGE: ICD-10-CM

## 2023-06-21 DIAGNOSIS — F11.20 OPIOID DEPENDENCE ON MAINTENANCE AGONIST THERAPY, NO SYMPTOMS (HCC): ICD-10-CM

## 2023-06-21 DIAGNOSIS — R35.0 FREQUENCY OF URINATION: Primary | ICD-10-CM

## 2023-06-21 DIAGNOSIS — R35.0 FREQUENCY OF URINATION: ICD-10-CM

## 2023-06-21 LAB
AMPHET UR QL SCN: NOT DETECTED
BARBITURATES UR QL SCN: NOT DETECTED
BENZODIAZ UR QL SCN: NOT DETECTED
BILIRUBIN, POC: NORMAL
BLOOD URINE, POC: NORMAL
BUPRENORPHINE URINE: POSITIVE
CANNABINOIDS UR QL SCN: NOT DETECTED
CLARITY, POC: NORMAL
COCAINE UR QL SCN: NOT DETECTED
COLOR, POC: YELLOW
DRUG SCREEN COMMENT UR-IMP: ABNORMAL
FENTANYL SCREEN, URINE: NOT DETECTED
GLUCOSE URINE, POC: NORMAL
KETONES, POC: NORMAL
LEUKOCYTE EST, POC: NORMAL
METHADONE UR QL SCN: NOT DETECTED
NITRITE, POC: NORMAL
OPIATES UR QL SCN: NOT DETECTED
OXYCODONE URINE: NOT DETECTED
PCP UR QL SCN: NOT DETECTED
PH, POC: 6.5
PROTEIN, POC: NORMAL
SPECIFIC GRAVITY, POC: >1.03
UROBILINOGEN, POC: 0.2

## 2023-06-21 PROCEDURE — 99214 OFFICE O/P EST MOD 30 MIN: CPT | Performed by: PSYCHIATRY & NEUROLOGY

## 2023-06-21 PROCEDURE — 4004F PT TOBACCO SCREEN RCVD TLK: CPT | Performed by: PSYCHIATRY & NEUROLOGY

## 2023-06-21 PROCEDURE — 81002 URINALYSIS NONAUTO W/O SCOPE: CPT

## 2023-06-21 PROCEDURE — G8420 CALC BMI NORM PARAMETERS: HCPCS

## 2023-06-21 PROCEDURE — 4004F PT TOBACCO SCREEN RCVD TLK: CPT

## 2023-06-21 PROCEDURE — G8427 DOCREV CUR MEDS BY ELIG CLIN: HCPCS

## 2023-06-21 PROCEDURE — 99213 OFFICE O/P EST LOW 20 MIN: CPT

## 2023-06-21 PROCEDURE — G8420 CALC BMI NORM PARAMETERS: HCPCS | Performed by: PSYCHIATRY & NEUROLOGY

## 2023-06-21 PROCEDURE — 81025 URINE PREGNANCY TEST: CPT

## 2023-06-21 PROCEDURE — G8428 CUR MEDS NOT DOCUMENT: HCPCS | Performed by: PSYCHIATRY & NEUROLOGY

## 2023-06-21 RX ORDER — FAMOTIDINE 20 MG/1
20 TABLET, FILM COATED ORAL 2 TIMES DAILY
Qty: 60 TABLET | Refills: 3 | Status: CANCELLED | OUTPATIENT
Start: 2023-06-21

## 2023-06-21 RX ORDER — FLUCONAZOLE 150 MG/1
150 TABLET ORAL ONCE
Qty: 1 TABLET | Refills: 0 | Status: SHIPPED | OUTPATIENT
Start: 2023-06-21 | End: 2023-06-21

## 2023-06-21 RX ORDER — BUPRENORPHINE AND NALOXONE 8; 2 MG/1; MG/1
2.5 FILM, SOLUBLE BUCCAL; SUBLINGUAL DAILY
Qty: 70 FILM | Refills: 0 | Status: SHIPPED | OUTPATIENT
Start: 2023-06-21 | End: 2023-07-19

## 2023-06-21 ASSESSMENT — ENCOUNTER SYMPTOMS
NAUSEA: 0
VOMITING: 0
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
## 2023-06-21 NOTE — PROGRESS NOTES
S: 25 y.o. female presents today for:     Frequency, odor and discharge x 2 weeks. Cleared after last visit here. Sx returned. White discharge. SA with 1 partner. No history of hematuria, frequency, urgency, nocturia, incontinence, stones or infection. O: VS: /69 (Site: Right Upper Arm, Position: Sitting, Cuff Size: Medium Adult)   Pulse 92   Temp 98.8 °F (37.1 °C) (Temporal)   Resp 18   Ht 5' 5\" (1.651 m)   Wt 143 lb (64.9 kg)   LMP 06/08/2023   SpO2 95%   BMI 23.80 kg/m²   AAO/NAD, appropriate affect for mood  CV:  RRR, no murmur  Resp: CTAB  Abdomen: Soft, non-tender, non-distended, positive BS in all 4 quadrants      Impression/Plan:   1) Urinary frequency- UA and culture. HCG  2) vag dc- diflucan      Attending Physician Statement  I have discussed the case, including pertinent history and exam findings with the resident. I agree with the documented assessment and plan.       Jessica Mcgee, DO

## 2023-06-21 NOTE — PROGRESS NOTES
[address] [place] [address]  FAMILY MEDICINE RESIDENCY PROGRAM  DATE OF VISIT : 2023    Patient : Manjula Obrine   Age : 25 y.o.  : 2000   MRN : [id number]   ______________________________________________________________________    Chief Complaint:   Chief Complaint   Patient presents with    Urinary Frequency     With odor with white discharge        HPI:   History obtained from the patient. Manjula Obrien is a 25 y.o. female presents for increased urinary frequency over the last 2 weeks. Patient also endorsing vaginal odor and white mucous-like discharge for 2 weeks. Was treated for UTI 1 month prior and completed antibiotics. Sexually active with 1 partner does not use contraception or barrier methods. Denies: Fevers, chills, flank pain, abdominal pain, dysuria, hematuria, vaginal itching or hx of STI. Review of Systems:  Review of Systems   Constitutional:  Negative for chills and fever. Gastrointestinal:  Negative for nausea and vomiting. Genitourinary:  Positive for frequency and vaginal discharge. Negative for decreased urine volume, dysuria, enuresis, flank pain, genital sores, hematuria, pelvic pain and vaginal pain. Skin:  Negative for rash.   ______________________________________________________________________    Physical Exam:    Vitals: /69 (Site: Right Upper Arm, Position: Sitting, Cuff Size: Medium Adult)   Pulse 92   Temp 98.8 °F (37.1 °C) (Temporal)   Resp 18   Ht 5' 5\" (1.651 m)   Wt 143 lb (64.9 kg)   LMP 2023   SpO2 95%   BMI 23.80 kg/m²   Physical Exam  Vitals reviewed. Constitutional:       General: She is not in acute distress. Appearance: She is not toxic-appearing. Cardiovascular:      Rate and Rhythm: Normal rate and regular rhythm. Heart sounds: Normal heart sounds. Pulmonary:      Effort: Pulmonary effort is normal.      Breath sounds: Normal breath sounds. Abdominal:      General: Abdomen is flat.       Palpations:

## 2023-06-22 LAB
BUPRENORPHINE, QUANTITATIVE, URINE: 91.3
COMMENT: NORMAL
INTEGRITY CHECK, CREATININE, URINE: 143.2
INTEGRITY CHECK, OXIDANT, URINE: 54
INTEGRITY CHECK, PH, URINE: 6 (ref 4.5–9)
INTEGRITY CHECK, SPECIFIC GRAVITY, URINE: 1.02 (ref 1–1.03)
INTEGRITY CHECK, SPECIMEN INTEGRITY, URINE: NORMAL
NALOXONE, QUANTITATIVE, URINE: 81.2
NORBUPRENORPHINE, QUANTITATIVE, URINE: 188.8

## 2023-06-24 DIAGNOSIS — N39.0 URINARY TRACT INFECTION WITHOUT HEMATURIA, SITE UNSPECIFIED: Primary | ICD-10-CM

## 2023-06-24 LAB
BACTERIA UR CULT: ABNORMAL
BACTERIA UR CULT: ABNORMAL
ORGANISM: ABNORMAL

## 2023-06-24 RX ORDER — AMPICILLIN 500 MG/1
500 CAPSULE ORAL 4 TIMES DAILY
Qty: 20 CAPSULE | Refills: 0 | Status: SHIPPED | OUTPATIENT
Start: 2023-06-24 | End: 2023-06-29

## 2023-06-26 ENCOUNTER — TELEPHONE (OUTPATIENT)
Dept: FAMILY MEDICINE CLINIC | Age: 23
End: 2023-06-26

## 2023-07-05 ENCOUNTER — OFFICE VISIT (OUTPATIENT)
Dept: FAMILY MEDICINE CLINIC | Age: 23
End: 2023-07-05
Payer: MEDICAID

## 2023-07-05 VITALS
HEART RATE: 69 BPM | SYSTOLIC BLOOD PRESSURE: 114 MMHG | TEMPERATURE: 99.1 F | RESPIRATION RATE: 16 BRPM | OXYGEN SATURATION: 96 % | WEIGHT: 144 LBS | BODY MASS INDEX: 23.99 KG/M2 | HEIGHT: 65 IN | DIASTOLIC BLOOD PRESSURE: 63 MMHG

## 2023-07-05 DIAGNOSIS — R30.9 PAIN PASSING URINE: ICD-10-CM

## 2023-07-05 DIAGNOSIS — B37.31 VAGINAL YEAST INFECTION: Primary | ICD-10-CM

## 2023-07-05 LAB
BILIRUBIN, POC: NEGATIVE
BLOOD URINE, POC: NEGATIVE
CLARITY, POC: NORMAL
COLOR, POC: NORMAL
GLUCOSE URINE, POC: NEGATIVE
KETONES, POC: NEGATIVE
LEUKOCYTE EST, POC: NORMAL
NITRITE, POC: NEGATIVE
PH, POC: 5.5
PROTEIN, POC: NEGATIVE
SPECIFIC GRAVITY, POC: 1.03
UROBILINOGEN, POC: 0.2

## 2023-07-05 PROCEDURE — 99213 OFFICE O/P EST LOW 20 MIN: CPT

## 2023-07-05 PROCEDURE — G8428 CUR MEDS NOT DOCUMENT: HCPCS

## 2023-07-05 PROCEDURE — G8420 CALC BMI NORM PARAMETERS: HCPCS

## 2023-07-05 PROCEDURE — 4004F PT TOBACCO SCREEN RCVD TLK: CPT

## 2023-07-05 PROCEDURE — 81002 URINALYSIS NONAUTO W/O SCOPE: CPT

## 2023-07-05 RX ORDER — FLUCONAZOLE 150 MG/1
150 TABLET ORAL
Qty: 2 TABLET | Refills: 0 | Status: SHIPPED | OUTPATIENT
Start: 2023-07-05 | End: 2023-07-11

## 2023-07-05 SDOH — ECONOMIC STABILITY: INCOME INSECURITY: HOW HARD IS IT FOR YOU TO PAY FOR THE VERY BASICS LIKE FOOD, HOUSING, MEDICAL CARE, AND HEATING?: NOT HARD AT ALL

## 2023-07-05 SDOH — ECONOMIC STABILITY: FOOD INSECURITY: WITHIN THE PAST 12 MONTHS, THE FOOD YOU BOUGHT JUST DIDN'T LAST AND YOU DIDN'T HAVE MONEY TO GET MORE.: NEVER TRUE

## 2023-07-05 SDOH — ECONOMIC STABILITY: FOOD INSECURITY: WITHIN THE PAST 12 MONTHS, YOU WORRIED THAT YOUR FOOD WOULD RUN OUT BEFORE YOU GOT MONEY TO BUY MORE.: NEVER TRUE

## 2023-07-05 SDOH — ECONOMIC STABILITY: HOUSING INSECURITY
IN THE LAST 12 MONTHS, WAS THERE A TIME WHEN YOU DID NOT HAVE A STEADY PLACE TO SLEEP OR SLEPT IN A SHELTER (INCLUDING NOW)?: NO

## 2023-07-05 ASSESSMENT — PATIENT HEALTH QUESTIONNAIRE - PHQ9
2. FEELING DOWN, DEPRESSED OR HOPELESS: 0
1. LITTLE INTEREST OR PLEASURE IN DOING THINGS: 0
SUM OF ALL RESPONSES TO PHQ9 QUESTIONS 1 & 2: 0
SUM OF ALL RESPONSES TO PHQ QUESTIONS 1-9: 0

## 2023-07-05 ASSESSMENT — LIFESTYLE VARIABLES: HOW OFTEN DO YOU HAVE A DRINK CONTAINING ALCOHOL: NEVER

## 2023-07-05 NOTE — PROGRESS NOTES
[address]  FAMILY MEDICINE RESIDENCY PROGRAM  DATE OF VISIT : 2023    Patient : Desmond Barrett   Age : 25 y.o.  : 2000   MRN : [id number]   ______________________________________________________________________    Chief Complaint:   Chief Complaint   Patient presents with    Check-Up     Vaginal discharge and itching       HPI:   Desmond Barrett is a 25 y.o. female with PMH GERD and opioid use disorder on suboxone maintenance therapy who presents for f/u UTI. The patient was previously seen in clinic regarding dysuria, vaginal itchiness, urinary frequency, and vaginal discharge. Culture grew Strep agalactiae that was pan-sensitive, and she was treated with ampicillin. She completed that course of antibiotics. In addition, she was given a dose of Diflucan d/t concern for a vaginal yeast infection. The patient states that her frequency and dysuria have improved. She states that she still has intense vaginal itchiness, and her discharge has changed in consistency. When she was previously seen, the discharge was mucous-like in consistency, whereas now it is thicker than cottage cheese. Patient denies fever, chills, nausea, vomiting, flank pain, and suprapubic pain. Past Medical History:  Past Medical History:   Diagnosis Date    GERD (gastroesophageal reflux disease)     Mild opioid use disorder, in sustained remission ([facility])        Allergies: Allergies   Allergen Reactions    Dust Mite Mixed Allergen Ext [Mite (D. Farinae)] Itching    Pollen Extract-Tree Extract [Pollen Extract]        Medications:    Current Outpatient Medications   Medication Sig Dispense Refill    fluconazole (DIFLUCAN) 150 MG tablet Take 1 tablet by mouth every 72 hours for 6 days 2 tablet 0    buprenorphine-naloxone (SUBOXONE) 8-2 MG FILM SL film Place 2.5 Film under the tongue daily for 28 days.  Max Daily Amount: 2.5 Film 70 Film 0    famotidine (PEPCID) 20 MG tablet Take 1 tablet by mouth 2 times daily

## 2023-07-07 LAB
CHLAMYDIA DNA UR QL NAA+PROBE: NEGATIVE
N GONORRHOEA DNA UR QL NAA+PROBE: NEGATIVE
SPECIMEN SOURCE: NORMAL
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
## 2023-07-19 ENCOUNTER — OFFICE VISIT (OUTPATIENT)
Age: 23
End: 2023-07-19
Payer: MEDICAID

## 2023-07-19 DIAGNOSIS — F11.20 OPIOID DEPENDENCE ON MAINTENANCE AGONIST THERAPY, NO SYMPTOMS (HCC): ICD-10-CM

## 2023-07-19 LAB
AMPHETAMINE SCREEN URINE: NEGATIVE
BARBITURATE SCREEN URINE: NEGATIVE
BENZODIAZEPINE SCREEN, URINE: NEGATIVE
BUPRENORPHINE URINE: POSITIVE
CANNABINOID SCREEN URINE: NEGATIVE
COCAINE METABOLITE, URINE: NEGATIVE
FENTANYL URINE: NEGATIVE
METHADONE SCREEN, URINE: NEGATIVE
OPIATES, URINE: NEGATIVE
OXYCODONE SCREEN URINE: NEGATIVE
PHENCYCLIDINE, URINE: NEGATIVE
TEST INFORMATION: ABNORMAL

## 2023-07-19 PROCEDURE — G8428 CUR MEDS NOT DOCUMENT: HCPCS

## 2023-07-19 PROCEDURE — 4004F PT TOBACCO SCREEN RCVD TLK: CPT

## 2023-07-19 PROCEDURE — G8420 CALC BMI NORM PARAMETERS: HCPCS

## 2023-07-19 PROCEDURE — 99214 OFFICE O/P EST MOD 30 MIN: CPT

## 2023-07-19 RX ORDER — BUPRENORPHINE AND NALOXONE 8; 2 MG/1; MG/1
2.5 FILM, SOLUBLE BUCCAL; SUBLINGUAL DAILY
Qty: 70 FILM | Refills: 0 | Status: SHIPPED | OUTPATIENT
Start: 2023-07-19 | End: 2023-08-16

## 2023-07-20 LAB
ABNORMAL SPECIMEN VALIDITY TEST: NORMAL
INTEGRITY CHECK, CREATININE, URINE: 231.8
INTEGRITY CHECK, OXIDANT, URINE: 51
INTEGRITY CHECK, PH, URINE: 5.5
INTEGRITY CHECK, SPECIFIC GRAVITY, URINE: 1.03

## 2023-07-21 LAB
BUPRENORPHINE, QUANTITATIVE, URINE: 797.1 NG/ML
NALOXONE, QUANTITATIVE, URINE: >1000 NG/ML
NORBUPRENORPHINE, QUANTITATIVE, URINE: >1000 NG/ML
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
## 2023-08-16 ENCOUNTER — OFFICE VISIT (OUTPATIENT)
Age: 23
End: 2023-08-16

## 2023-08-16 DIAGNOSIS — F11.20 OPIOID DEPENDENCE ON MAINTENANCE AGONIST THERAPY, NO SYMPTOMS (HCC): ICD-10-CM

## 2023-08-16 RX ORDER — BUPRENORPHINE AND NALOXONE 8; 2 MG/1; MG/1
2.5 FILM, SOLUBLE BUCCAL; SUBLINGUAL DAILY
Qty: 70 FILM | Refills: 0 | Status: SHIPPED | OUTPATIENT
Start: 2023-08-16 | End: 2023-09-13

## 2023-08-18 LAB
ABNORMAL SPECIMEN VALIDITY TEST: NORMAL
AMPHETAMINE SCREEN URINE: NEGATIVE
BARBITURATE SCREEN URINE: NEGATIVE
BENZODIAZEPINE SCREEN, URINE: NEGATIVE
BUPRENORPHINE URINE: POSITIVE
BUPRENORPHINE, QUANTITATIVE, URINE: 44.2 NG/ML
CANNABINOID SCREEN URINE: NEGATIVE
COCAINE METABOLITE, URINE: NEGATIVE
COMPLIANCE DRUG ANALYSIS, URINE: NORMAL
FENTANYL URINE: NEGATIVE
INTEGRITY CHECK, CREATININE, URINE: 75.2
INTEGRITY CHECK, OXIDANT, URINE: <40
INTEGRITY CHECK, PH, URINE: 5.7
INTEGRITY CHECK, SPECIFIC GRAVITY, URINE: 1.02
METHADONE SCREEN, URINE: NEGATIVE
NALOXONE, QUANTITATIVE, URINE: 24.2 NG/ML
NORBUPRENORPHINE, QUANTITATIVE, URINE: 84.4 NG/ML
OPIATES, URINE: NEGATIVE
OXYCODONE SCREEN URINE: NEGATIVE
PHENCYCLIDINE, URINE: NEGATIVE
TEST INFORMATION: ABNORMAL
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
## 2023-09-13 ENCOUNTER — TELEPHONE (OUTPATIENT)
Dept: FAMILY MEDICINE CLINIC | Age: 23
End: 2023-09-13

## 2023-09-13 ENCOUNTER — OFFICE VISIT (OUTPATIENT)
Age: 23
End: 2023-09-13

## 2023-09-13 DIAGNOSIS — F11.20 OPIOID DEPENDENCE ON MAINTENANCE AGONIST THERAPY, NO SYMPTOMS (HCC): ICD-10-CM

## 2023-09-13 LAB
AMPHETAMINE SCREEN URINE: NEGATIVE
BARBITURATE SCREEN URINE: NEGATIVE
BENZODIAZEPINE SCREEN, URINE: NEGATIVE
BUPRENORPHINE URINE: POSITIVE
CANNABINOID SCREEN URINE: NEGATIVE
COCAINE METABOLITE, URINE: POSITIVE
FENTANYL URINE: NEGATIVE
METHADONE SCREEN, URINE: NEGATIVE
OPIATES, URINE: NEGATIVE
OXYCODONE SCREEN URINE: NEGATIVE
PHENCYCLIDINE, URINE: NEGATIVE
TEST INFORMATION: ABNORMAL

## 2023-09-13 RX ORDER — BUPRENORPHINE AND NALOXONE 8; 2 MG/1; MG/1
2.5 FILM, SOLUBLE BUCCAL; SUBLINGUAL DAILY
Qty: 70 FILM | Refills: 0 | Status: SHIPPED | OUTPATIENT
Start: 2023-09-13 | End: 2023-10-11

## 2023-09-13 NOTE — TELEPHONE ENCOUNTER
The patient stopped in after her behavior health appt for symptoms of yeast infection  It is same as it was in July    She wanted to stay to be seen as a same day but everyone is overbooked  Patient is requesting medication to be sent in and if no better she will call for appointment  Please advise

## 2023-09-14 LAB
ABNORMAL SPECIMEN VALIDITY TEST: NORMAL
BENZOYLECGONINE, QUANTITATIVE, URINE: 174.2 NG/ML
BUPRENORPHINE, QUANTITATIVE, URINE: 422.3 NG/ML
COMPLIANCE DRUG ANALYSIS, URINE: NORMAL
INTEGRITY CHECK, CREATININE, URINE: 151.2
INTEGRITY CHECK, OXIDANT, URINE: <40
INTEGRITY CHECK, PH, URINE: 8
INTEGRITY CHECK, SPECIFIC GRAVITY, URINE: 1.01
NALOXONE, QUANTITATIVE, URINE: 885.3 NG/ML
NORBUPRENORPHINE, QUANTITATIVE, URINE: 367.7 NG/ML

## 2023-09-15 ENCOUNTER — TELEPHONE (OUTPATIENT)
Dept: FAMILY MEDICINE CLINIC | Age: 23
End: 2023-09-15

## 2023-09-15 NOTE — TELEPHONE ENCOUNTER
Pt wants a refill on her last antibiotic due to frequent urination with white discharge. Pt came up to the office yesterday  but wasn't able to be seen. Please advise.

## 2023-09-27 ENCOUNTER — NURSE ONLY (OUTPATIENT)
Dept: FAMILY MEDICINE CLINIC | Age: 23
End: 2023-09-27

## 2023-09-27 ENCOUNTER — TELEPHONE (OUTPATIENT)
Dept: FAMILY MEDICINE CLINIC | Age: 23
End: 2023-09-27

## 2023-09-27 DIAGNOSIS — R35.0 FREQUENCY OF URINATION: Primary | ICD-10-CM

## 2023-09-27 LAB
BILIRUBIN, POC: NEGATIVE
BLOOD URINE, POC: NORMAL
CLARITY, POC: NORMAL
COLOR, POC: YELLOW
GLUCOSE URINE, POC: NEGATIVE
KETONES, POC: NEGATIVE
LEUKOCYTE EST, POC: NORMAL
NITRITE, POC: NEGATIVE
PH, POC: 7
PROTEIN, POC: NORMAL
SPECIFIC GRAVITY, POC: 1.02
UROBILINOGEN, POC: 1

## 2023-09-27 NOTE — TELEPHONE ENCOUNTER
Patient came into the office started that she has a UTI symptoms burning,frequency, with white discharge for 3 weeks  I had patient give a urine sample and I did a poct urinalysis   Results in patients chart  And if you what to place any other orders   Patient could not stay to be seen she has no car and got a ride  Please advise  Thank you April

## 2023-09-27 NOTE — TELEPHONE ENCOUNTER
Please call her. She really needs an appointment to be seen. Even if she has a urinary tract infection, this would not be expected to explain the white discharge symptoms she has experienced, and it seems that this has been happening for quite some time. She also could be seen by her Ob/Gyn if she chooses. However, we need more information to be able to give her the best treatment. I will place an order for a urine culture at this time, and please offer her an acute visit in our office with any available physician in the next day or so. Thank you!

## 2023-09-29 ENCOUNTER — TELEPHONE (OUTPATIENT)
Dept: FAMILY MEDICINE CLINIC | Age: 23
End: 2023-09-29

## 2023-09-29 LAB
ABNORMAL SPECIMEN VALIDITY TEST: NORMAL
AMPHETAMINE SCREEN URINE: NEGATIVE
BARBITURATE SCREEN URINE: NEGATIVE
BENZODIAZEPINE SCREEN, URINE: NEGATIVE
BUPRENORPHINE URINE: POSITIVE
CANNABINOID SCREEN URINE: NEGATIVE
COCAINE METABOLITE, URINE: NEGATIVE
FENTANYL URINE: NEGATIVE
INTEGRITY CHECK, CREATININE, URINE: 95.2
INTEGRITY CHECK, OXIDANT, URINE: 55
INTEGRITY CHECK, PH, URINE: 7.1
INTEGRITY CHECK, SPECIFIC GRAVITY, URINE: 1.01
METHADONE SCREEN, URINE: NEGATIVE
OPIATES, URINE: NEGATIVE
OXYCODONE SCREEN URINE: NEGATIVE
PHENCYCLIDINE, URINE: NEGATIVE
TEST INFORMATION: ABNORMAL

## 2023-10-02 DIAGNOSIS — N30.90 CYSTITIS: Primary | ICD-10-CM

## 2023-10-02 LAB
BUPRENORPHINE, QUANTITATIVE, URINE: 82.2 NG/ML
COMPLIANCE DRUG ANALYSIS, URINE: NORMAL
CULTURE: ABNORMAL
CULTURE: ABNORMAL
Lab: ABNORMAL
NALOXONE, QUANTITATIVE, URINE: 51.9 NG/ML
NORBUPRENORPHINE, QUANTITATIVE, URINE: 139.4 NG/ML
SPECIMEN DESCRIPTION: ABNORMAL

## 2023-10-02 RX ORDER — SULFAMETHOXAZOLE AND TRIMETHOPRIM 800; 160 MG/1; MG/1
1 TABLET ORAL 2 TIMES DAILY
Qty: 6 TABLET | Refills: 0 | Status: SHIPPED | OUTPATIENT
Start: 2023-10-02 | End: 2023-10-05

## 2023-10-07 NOTE — PROGRESS NOTES
Addiction Psychiatry/Pain Follow Up Note    CC: \"Doing good. \"    CURRENTLY: Patient being seen at [facility] for Opioid dependence on Agonist treatment. Feeling stressed about her housing  \"Would like to smoke some marijuana but my  wont let me. \"Will shut my safety plan down in a month, need a couple more clean urine. \"  Need to find a job and an apartment. Looking for something around $750 monthly  Her boyfriend is working and she states is doing well  Will try to get into some housing for homeless and will elevate her on the list to get housing. Living with family and cooking for 4-5 peoples every day and taking care of baby. Patient had a cold and gave it to her baby so that was stressful  Patient not ready to decrease on Suboxone just yet \"things are too stressful right now. \"  Denies constipation and using stool softener. Has GERD and taking Pepcid  Saw [facility] today for counseling going well. Discussed sad [facility] will be leaving soon for a new job  Denies pain    Last Appt: Seen today with her 11 month old baby, who she reports is reaching developmental milestones and is healthy. Reports anxiety and depression are manageable, feels she will feel better once she finds her own housing. Interested in getting a job but does not have anyone to watch her baby. Tolerating Suboxone, complaining of mild constipation - encouraged to resume stool softeners, mild nausea  Denies any cravings  Last use January 31st   Currently taking Suboxone 8-2 mg 2.5 films daily, interested in trying to taper down next month : Patient being seen at [facility] for Opioid dependence on Agonist treatment. Met with patient to discuss progress with treatment. Patient reports sober date January 31st and got Suboxone then. Denies cravings now and no relapse. Patient has a 6th month one \"little man is all that's on your mind. \"   Trying to get a job  Trying to find a place to live on section 8 list waiting for

## 2023-10-09 ENCOUNTER — OFFICE VISIT (OUTPATIENT)
Age: 23
End: 2023-10-09
Payer: MEDICAID

## 2023-10-09 ENCOUNTER — OFFICE VISIT (OUTPATIENT)
Dept: FAMILY MEDICINE CLINIC | Age: 23
End: 2023-10-09
Payer: MEDICAID

## 2023-10-09 VITALS
HEART RATE: 92 BPM | DIASTOLIC BLOOD PRESSURE: 58 MMHG | WEIGHT: 146 LBS | RESPIRATION RATE: 18 BRPM | TEMPERATURE: 99 F | HEIGHT: 65 IN | BODY MASS INDEX: 24.32 KG/M2 | OXYGEN SATURATION: 97 % | SYSTOLIC BLOOD PRESSURE: 116 MMHG

## 2023-10-09 DIAGNOSIS — F11.20 OPIOID DEPENDENCE ON MAINTENANCE AGONIST THERAPY, NO SYMPTOMS (HCC): ICD-10-CM

## 2023-10-09 DIAGNOSIS — N89.8 WHITE VAGINAL DISCHARGE: ICD-10-CM

## 2023-10-09 DIAGNOSIS — N89.8 VAGINAL DISCHARGE: Primary | ICD-10-CM

## 2023-10-09 LAB
AMPHETAMINE SCREEN URINE: NEGATIVE
BACTERIA: ABNORMAL
BARBITURATE SCREEN URINE: NEGATIVE
BENZODIAZEPINE SCREEN, URINE: NEGATIVE
BILIRUBIN URINE: NEGATIVE
BUPRENORPHINE URINE: POSITIVE
CANNABINOID SCREEN URINE: NEGATIVE
COCAINE METABOLITE, URINE: NEGATIVE
COLOR: YELLOW
EPITHELIAL CELLS UA: ABNORMAL /HPF
FENTANYL URINE: NEGATIVE
GLUCOSE URINE: NEGATIVE MG/DL
KETONES, URINE: NEGATIVE MG/DL
LEUKOCYTE ESTERASE, URINE: ABNORMAL
METHADONE SCREEN, URINE: NEGATIVE
NITRITE, URINE: NEGATIVE
OPIATES, URINE: NEGATIVE
OXYCODONE SCREEN URINE: NEGATIVE
PH UA: 6 (ref 5–9)
PHENCYCLIDINE, URINE: NEGATIVE
PROTEIN UA: NEGATIVE MG/DL
RBC UA: ABNORMAL /HPF
SPECIFIC GRAVITY UA: 1.02 (ref 1–1.03)
TEST INFORMATION: ABNORMAL
TURBIDITY: CLEAR
URINE HGB: ABNORMAL
UROBILINOGEN, URINE: 0.2 EU/DL (ref 0–1)
WBC UA: ABNORMAL /HPF

## 2023-10-09 PROCEDURE — 99213 OFFICE O/P EST LOW 20 MIN: CPT

## 2023-10-09 PROCEDURE — 36415 COLL VENOUS BLD VENIPUNCTURE: CPT | Performed by: FAMILY MEDICINE

## 2023-10-09 PROCEDURE — G8420 CALC BMI NORM PARAMETERS: HCPCS | Performed by: FAMILY MEDICINE

## 2023-10-09 PROCEDURE — 99214 OFFICE O/P EST MOD 30 MIN: CPT

## 2023-10-09 PROCEDURE — G8484 FLU IMMUNIZE NO ADMIN: HCPCS | Performed by: FAMILY MEDICINE

## 2023-10-09 PROCEDURE — G8428 CUR MEDS NOT DOCUMENT: HCPCS | Performed by: FAMILY MEDICINE

## 2023-10-09 PROCEDURE — G8484 FLU IMMUNIZE NO ADMIN: HCPCS

## 2023-10-09 PROCEDURE — 4004F PT TOBACCO SCREEN RCVD TLK: CPT | Performed by: FAMILY MEDICINE

## 2023-10-09 PROCEDURE — 4004F PT TOBACCO SCREEN RCVD TLK: CPT

## 2023-10-09 PROCEDURE — G8420 CALC BMI NORM PARAMETERS: HCPCS

## 2023-10-09 PROCEDURE — G8428 CUR MEDS NOT DOCUMENT: HCPCS

## 2023-10-09 RX ORDER — FLUCONAZOLE 150 MG/1
150 TABLET ORAL ONCE
Qty: 1 TABLET | Refills: 0 | Status: SHIPPED | OUTPATIENT
Start: 2023-10-09 | End: 2023-10-09

## 2023-10-09 RX ORDER — BUPRENORPHINE AND NALOXONE 8; 2 MG/1; MG/1
2.5 FILM, SOLUBLE BUCCAL; SUBLINGUAL DAILY
Qty: 70 FILM | Refills: 0 | Status: SHIPPED | OUTPATIENT
Start: 2023-10-09 | End: 2023-11-06

## 2023-10-10 LAB
ABNORMAL SPECIMEN VALIDITY TEST: NORMAL
BUPRENORPHINE, QUANTITATIVE, URINE: 440.8 NG/ML
COMPLIANCE DRUG ANALYSIS, URINE: NORMAL
CULTURE: NORMAL
HBV SURFACE AB TITR SER: <3.1 MIU/ML (ref 0–9.99)
HEPATITIS B SURF AG,XHBAGS: NONREACTIVE
HEPATITIS C ANTIBODY: NONREACTIVE
HIV AG/AB: NONREACTIVE
INTEGRITY CHECK, CREATININE, URINE: 145.2
INTEGRITY CHECK, OXIDANT, URINE: <40
INTEGRITY CHECK, PH, URINE: 5.5
INTEGRITY CHECK, SPECIFIC GRAVITY, URINE: 1.03
NALOXONE, QUANTITATIVE, URINE: >1000 NG/ML
NORBUPRENORPHINE, QUANTITATIVE, URINE: 687.4 NG/ML
RPR: NONREACTIVE
SPECIMEN DESCRIPTION: NORMAL

## 2023-10-11 LAB
C. TRACHOMATIS DNA ,URINE: NEGATIVE
N. GONORRHOEAE DNA, URINE: NEGATIVE

## 2023-10-16 ENCOUNTER — OFFICE VISIT (OUTPATIENT)
Dept: FAMILY MEDICINE CLINIC | Age: 23
End: 2023-10-16
Payer: MEDICAID

## 2023-10-16 VITALS
HEART RATE: 70 BPM | WEIGHT: 149 LBS | OXYGEN SATURATION: 94 % | SYSTOLIC BLOOD PRESSURE: 102 MMHG | TEMPERATURE: 98.1 F | BODY MASS INDEX: 24.79 KG/M2 | DIASTOLIC BLOOD PRESSURE: 56 MMHG

## 2023-10-16 DIAGNOSIS — N76.1 SUBACUTE VAGINITIS: Primary | ICD-10-CM

## 2023-10-16 PROCEDURE — G8484 FLU IMMUNIZE NO ADMIN: HCPCS

## 2023-10-16 PROCEDURE — G8427 DOCREV CUR MEDS BY ELIG CLIN: HCPCS

## 2023-10-16 PROCEDURE — 99213 OFFICE O/P EST LOW 20 MIN: CPT

## 2023-10-16 PROCEDURE — 4004F PT TOBACCO SCREEN RCVD TLK: CPT

## 2023-10-16 PROCEDURE — G8420 CALC BMI NORM PARAMETERS: HCPCS

## 2023-10-16 RX ORDER — FLUCONAZOLE 150 MG/1
150 TABLET ORAL ONCE
Qty: 1 TABLET | Refills: 0 | Status: SHIPPED | OUTPATIENT
Start: 2023-10-16 | End: 2023-10-16

## 2023-10-16 RX ORDER — METRONIDAZOLE 500 MG/1
500 TABLET ORAL 2 TIMES DAILY
Qty: 14 TABLET | Refills: 0 | Status: SHIPPED | OUTPATIENT
Start: 2023-10-16 | End: 2023-10-23

## 2023-10-19 LAB
ABNORMAL SPECIMEN VALIDITY TEST: NORMAL
AMPHETAMINE SCREEN URINE: NEGATIVE
BARBITURATE SCREEN URINE: NEGATIVE
BENZODIAZEPINE SCREEN, URINE: NEGATIVE
BUPRENORPHINE URINE: POSITIVE
CANNABINOID SCREEN URINE: NEGATIVE
COCAINE METABOLITE, URINE: NEGATIVE
FENTANYL URINE: NEGATIVE
INTEGRITY CHECK, CREATININE, URINE: 129.3
INTEGRITY CHECK, OXIDANT, URINE: <40
INTEGRITY CHECK, PH, URINE: 5.1
INTEGRITY CHECK, SPECIFIC GRAVITY, URINE: 1.02
METHADONE SCREEN, URINE: NEGATIVE
OPIATES, URINE: NEGATIVE
OXYCODONE SCREEN URINE: NEGATIVE
PHENCYCLIDINE, URINE: NEGATIVE
TEST INFORMATION: ABNORMAL

## 2023-10-20 LAB
BUPRENORPHINE, QUANTITATIVE, URINE: 72.2 NG/ML
COMPLIANCE DRUG ANALYSIS, URINE: NORMAL
NALOXONE, QUANTITATIVE, URINE: 52.1 NG/ML
NORBUPRENORPHINE, QUANTITATIVE, URINE: 211.8 NG/ML
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
## 2023-11-01 ENCOUNTER — OFFICE VISIT (OUTPATIENT)
Age: 23
End: 2023-11-01

## 2023-11-01 ENCOUNTER — OFFICE VISIT (OUTPATIENT)
Age: 23
End: 2023-11-01
Payer: MEDICAID

## 2023-11-01 DIAGNOSIS — F11.20 OPIOID DEPENDENCE ON MAINTENANCE AGONIST THERAPY, NO SYMPTOMS (HCC): ICD-10-CM

## 2023-11-01 DIAGNOSIS — F11.20 OPIOID DEPENDENCE ON MAINTENANCE AGONIST THERAPY, NO SYMPTOMS (HCC): Primary | ICD-10-CM

## 2023-11-01 PROCEDURE — 99214 OFFICE O/P EST MOD 30 MIN: CPT

## 2023-11-01 PROCEDURE — G8428 CUR MEDS NOT DOCUMENT: HCPCS

## 2023-11-01 PROCEDURE — 4004F PT TOBACCO SCREEN RCVD TLK: CPT

## 2023-11-01 PROCEDURE — G8484 FLU IMMUNIZE NO ADMIN: HCPCS

## 2023-11-01 PROCEDURE — NBSRV NON-BILLABLE SERVICE

## 2023-11-01 PROCEDURE — G8420 CALC BMI NORM PARAMETERS: HCPCS

## 2023-11-01 RX ORDER — BUPRENORPHINE AND NALOXONE 8; 2 MG/1; MG/1
2.5 FILM, SOLUBLE BUCCAL; SUBLINGUAL DAILY
Qty: 70 FILM | Refills: 0 | Status: SHIPPED | OUTPATIENT
Start: 2023-11-06 | End: 2023-12-04

## 2023-11-02 LAB
ABNORMAL SPECIMEN VALIDITY TEST: NORMAL
INTEGRITY CHECK, CREATININE, URINE: 141.1 MG/DL (ref 22–250)
INTEGRITY CHECK, OXIDANT, URINE: <40 MG/L
INTEGRITY CHECK, PH, URINE: 5.4 (ref 4.5–9)
INTEGRITY CHECK, SPECIFIC GRAVITY, URINE: 1.03 (ref 1–1.03)

## 2023-11-03 LAB
BUPRENORPHINE, QUANTITATIVE, URINE: 366.4 NG/ML
COMPLIANCE DRUG ANALYSIS, URINE: NORMAL
NALOXONE, QUANTITATIVE, URINE: 415.5 NG/ML
NORBUPRENORPHINE, QUANTITATIVE, URINE: 251.7 NG/ML

## 2023-11-28 NOTE — PROGRESS NOTES
Cocaine Metabolite, Urine NEGATIVE NEGATIVE  NEGATIVE CM  NEGATIVE CM  NEGATIVE CM  POSITIVE Abnormal  CM  NEGATIVE CM  NEGATIVE CM    Comment: Cutoff: 300 ng/ml   Benzodiazepine Screen, Urine NEGATIVE NEGATIVE  NEGATIVE CM  NEGATIVE CM  NEGATIVE CM  NEGATIVE CM  NEGATIVE CM  NEGATIVE CM    Comment: Cutoff: 200 ng/ml   Buprenorphine Urine NEGATIVE POSITIVE Abnormal   POSITIVE Abnormal  CM  POSITIVE Abnormal  CM  POSITIVE Abnormal  CM  POSITIVE Abnormal  CM  POSITIVE Abnormal  CM  POSITIVE Abnormal  CM    Comment: Cutoff: 5 ng/ml   Methadone Screen, Urine NEGATIVE NEGATIVE  NEGATIVE CM  NEGATIVE CM  NEGATIVE CM  NEGATIVE CM  NEGATIVE CM  NEGATIVE CM    Comment: Cutoff: 300 ng/ml   Opiates, Urine NEGATIVE NEGATIVE  NEGATIVE CM  NEGATIVE CM  NEGATIVE CM  NEGATIVE CM  NEGATIVE CM  NEGATIVE CM    Comment: Cutoff: 300 ng/ml Note: The Opiate screen is not intended to detect Oxycodone. Phencyclidine, Urine NEGATIVE NEGATIVE  NEGATIVE CM  NEGATIVE CM  NEGATIVE CM  NEGATIVE CM  NEGATIVE CM  NEGATIVE CM    Comment: Cutoff: 25 ng/ml   Cannabinoid Scrn, Ur NEGATIVE NEGATIVE  NEGATIVE CM  NEGATIVE CM  NEGATIVE CM  NEGATIVE CM  NEGATIVE CM  NEGATIVE CM    Comment: Cutoff: 50 ng/ml   Fentanyl, Ur NEGATIVE NEGATIVE  NEGATIVE CM  NEGATIVE CM  NEGATIVE CM  NEGATIVE CM  NEGATIVE CM  NEGATIVE CM    Comment: Cutoff: 1.0 ng/ml   Oxycodone Screen, Ur NEGATIVE NEGATIVE  NEGATIVE CM  NEGATIVE CM  NEGATIVE CM  NEGATIVE CM  NEGATIVE CM  NEGATIVE CM    Comment: Cutoff: 100 ng/ml         MSE   White female appears age. Pleasant, cooperative, forthcoming. Normal psychomotor activity, strength, tone, eye contact. Gait not assessed. Mood euthymic. Affect flexible. Speech clear. Thoughts organized. Content future oriented. No suicidal or homicidal ideations. No paranoia, delusions or hallucinations. Orientation, concentration, recent and remote memory are grossly intact. Fund of knowledge fair. Language use fair.   [employer] and

## 2023-11-29 ENCOUNTER — OFFICE VISIT (OUTPATIENT)
Age: 23
End: 2023-11-29
Payer: MEDICAID

## 2023-11-29 DIAGNOSIS — F11.20 OPIOID DEPENDENCE ON MAINTENANCE AGONIST THERAPY, NO SYMPTOMS (HCC): ICD-10-CM

## 2023-11-29 DIAGNOSIS — F11.20 OPIOID DEPENDENCE ON MAINTENANCE AGONIST THERAPY, NO SYMPTOMS (HCC): Primary | ICD-10-CM

## 2023-11-29 PROCEDURE — G8484 FLU IMMUNIZE NO ADMIN: HCPCS

## 2023-11-29 PROCEDURE — 99214 OFFICE O/P EST MOD 30 MIN: CPT

## 2023-11-29 PROCEDURE — G8428 CUR MEDS NOT DOCUMENT: HCPCS

## 2023-11-29 PROCEDURE — G8420 CALC BMI NORM PARAMETERS: HCPCS

## 2023-11-29 PROCEDURE — 4004F PT TOBACCO SCREEN RCVD TLK: CPT

## 2023-11-29 RX ORDER — BUPRENORPHINE AND NALOXONE 8; 2 MG/1; MG/1
2.5 FILM, SOLUBLE BUCCAL; SUBLINGUAL DAILY
Qty: 70 FILM | Refills: 0 | Status: SHIPPED | OUTPATIENT
Start: 2023-11-29 | End: 2023-12-27

## 2023-11-29 NOTE — PROGRESS NOTES
Office Visit  Open     11/29/2023  [facility]  Progress Notes  Loretta Calzada, LCDCIII (Counselor)   Counselor  Unsigned      Progress Note      This is a non-billable service     Date: 11/29/2023  Start Time: 11:30 AM  End Time: 12:00 PM     Mental Status Exam - Pt presented alert and oriented x4. Pt was pleasant and forthcoming with information. Pts mood appeared calm. Pt presented with thought process organized, speech clear. Pt denied SI/HI/AVH.        Counselor met with pt  and the peer recovery supporter Nayely Landry to discuss current treatment and progress. Requested pt to provide an overview of her treatment  progress to introduce herself to the peer recovery supporter.      Pt came into this session sharing that things are much improved since her Safety Plan closed since Nov 9th, 2023. This has allowed her to get out more and spend time with her boyfriend and get of the house she stated is stressful at times. Pt shared that housing and transportation continue to be her two main issues.She stated that she has made some progress on housing as she just had an appointment with [facility] on Nov 8th and they are to get back to her within 30 days. It was relayed to her that she can go to [facility] in [place] for her medication and counseling appointment. She stated that she would still need transportation as her aunt drives her and she stays at her aunt's employment during her shift when she comes to [place]. The recovery  offered to provide her with a transportation voucher and all agreed to try it for her next appointment to [place].     She  continued to state that she cannot get to meetings as she has no transportation. She was provided with several different ways to get to meetings (walk, make calls, or utilize Zoom) She stated that all these are feasible as 2 meeting places are walking distance. Handouts and resources were provided to

## 2023-11-30 LAB
ABNORMAL SPECIMEN VALIDITY TEST: NORMAL
INTEGRITY CHECK, CREATININE, URINE: 201.9 MG/DL (ref 22–250)
INTEGRITY CHECK, OXIDANT, URINE: <40 MG/L
INTEGRITY CHECK, PH, URINE: 5.6 (ref 4.5–9)
INTEGRITY CHECK, SPECIFIC GRAVITY, URINE: 1.03 (ref 1–1.03)

## 2023-12-04 LAB
BUPRENORPHINE, QUANTITATIVE, URINE: 393.8 NG/ML
COMPLIANCE DRUG ANALYSIS, URINE: NORMAL
NALOXONE, QUANTITATIVE, URINE: 882.3 NG/ML
NORBUPRENORPHINE, QUANTITATIVE, URINE: 705 NG/ML

## 2023-12-20 ENCOUNTER — SOCIAL WORK (OUTPATIENT)
Age: 23
End: 2023-12-20

## 2023-12-20 NOTE — PROGRESS NOTES
12/20/23    Attempted to call pt on several occasions on mobile phone-left messages and at home (constantly busy) with no call backs and no response. Pt will need to schedule an medication appointment with GARRY Tierney and counseling follow-up with andrea BLAND requesting an update on her last progress and will send a report.    Electronically signed by TERRY Spring on 12/20/2023 at 1:56 PM

## 2023-12-28 ENCOUNTER — OFFICE VISIT (OUTPATIENT)
Age: 23
End: 2023-12-28
Payer: MEDICAID

## 2023-12-28 VITALS
DIASTOLIC BLOOD PRESSURE: 51 MMHG | SYSTOLIC BLOOD PRESSURE: 100 MMHG | HEART RATE: 65 BPM | OXYGEN SATURATION: 96 % | TEMPERATURE: 96.4 F

## 2023-12-28 DIAGNOSIS — F11.20 OPIOID DEPENDENCE ON MAINTENANCE AGONIST THERAPY, NO SYMPTOMS (HCC): ICD-10-CM

## 2023-12-28 PROCEDURE — 4004F PT TOBACCO SCREEN RCVD TLK: CPT

## 2023-12-28 PROCEDURE — G8484 FLU IMMUNIZE NO ADMIN: HCPCS

## 2023-12-28 PROCEDURE — G8420 CALC BMI NORM PARAMETERS: HCPCS

## 2023-12-28 PROCEDURE — 99214 OFFICE O/P EST MOD 30 MIN: CPT

## 2023-12-28 PROCEDURE — G8428 CUR MEDS NOT DOCUMENT: HCPCS

## 2023-12-28 RX ORDER — BUPRENORPHINE AND NALOXONE 8; 2 MG/1; MG/1
2.5 FILM, SOLUBLE BUCCAL; SUBLINGUAL DAILY
Qty: 75 FILM | Refills: 0 | Status: SHIPPED | OUTPATIENT
Start: 2023-12-28 | End: 2024-01-27
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
## 2024-01-30 ENCOUNTER — HOSPITAL ENCOUNTER (OUTPATIENT)
Dept: PSYCHIATRY | Age: 24
Setting detail: THERAPIES SERIES
Discharge: HOME | End: 2024-01-30
Payer: MEDICAID

## 2024-01-30 ENCOUNTER — HOSPITAL ENCOUNTER (OUTPATIENT)
Dept: PSYCHIATRY | Age: 24
Setting detail: THERAPIES SERIES
Discharge: HOME | End: 2024-01-30

## 2024-01-30 DIAGNOSIS — F11.20 OPIOID DEPENDENCE ON AGONIST THERAPY (HCC): Primary | ICD-10-CM

## 2024-01-30 PROCEDURE — 99214 OFFICE O/P EST MOD 30 MIN: CPT

## 2024-01-30 RX ORDER — BUPRENORPHINE AND NALOXONE 8; 2 MG/1; MG/1
1.25 FILM, SOLUBLE BUCCAL; SUBLINGUAL 2 TIMES DAILY
Qty: 70 FILM | Refills: 0 | Status: SHIPPED | OUTPATIENT
Start: 2024-01-30 | End: 2024-02-27

## 2024-01-30 NOTE — PROGRESS NOTES
CM POSITIVE Abnormal  CM POSITIVE Abnormal  CM POSITIVE Abnormal  CM POSITIVE Abnormal  CM POSITIVE Abnormal  CM   Comment: Cutoff: 5 ng/ml   Methadone Screen, Urine  NEGATIVE NEGATIVE NEGATIVE CM NEGATIVE CM NEGATIVE CM NEGATIVE CM NEGATIVE CM NEGATIVE CM   Comment: Cutoff: 300 ng/ml   Opiates, Urine  NEGATIVE NEGATIVE NEGATIVE CM NEGATIVE CM NEGATIVE CM NEGATIVE CM NEGATIVE CM NEGATIVE CM   Comment: Cutoff: 300 ng/ml Note: The Opiate screen is not intended to detect Oxycodone.   Phencyclidine, Urine  NEGATIVE NEGATIVE NEGATIVE CM NEGATIVE CM NEGATIVE CM NEGATIVE CM NEGATIVE CM NEGATIVE CM   Comment: Cutoff: 25 ng/ml   Cannabinoid Scrn, Ur  NEGATIVE NEGATIVE NEGATIVE CM NEGATIVE CM NEGATIVE CM NEGATIVE CM NEGATIVE CM NEGATIVE CM   Comment: Cutoff: 50 ng/ml   Fentanyl, Ur  NEGATIVE NEGATIVE NEGATIVE CM NEGATIVE CM NEGATIVE CM NEGATIVE CM NEGATIVE CM NEGATIVE CM   Comment: Cutoff: 1.0 ng/ml   Oxycodone Screen, Ur  NEGATIVE NEGATIVE NEGATIVE CM NEGATIVE CM NEGATIVE CM NEGATIVE CM NEGATIVE CM NEGATIVE CM            MSE   White female appears age.  Pleasant, cooperative, forthcoming.  Normal psychomotor activity, strength, tone, eye contact.  Gait not assessed.  Mood euthymic.  Affect flexible.  Speech clear.  Thoughts organized.  Content future oriented.  No suicidal or homicidal ideations.  No paranoia, delusions or hallucinations.  Orientation, concentration, recent and remote memory are grossly intact.  Fund of knowledge fair.  Language use fair.  Insight and judgment fair.      MEDICATIONS:         Suboxone 8-2 mg film 2.5 mg films daily (may reduce to 2 films daily soon)     Assessment  1) Opioid Use Disorder on Agonist TX in remission   2) THC use has stopped levels decreasing   3)      Plan: Therapeutic communication given.  Continue with current dose of Suboxone 8-2 mg film 2.5 films daily. Patient is to cut back to 2 films a day as she can go. Discussed dose reduction soon but she feels she may be ready to

## 2024-01-30 NOTE — PLAN OF CARE
Progress Note      This is a non-billable service     Date: 1/30/2024  Start Time: 12:30 PM  End Time: 1:15 PM     Mental Status Exam - Pt presented alert and oriented x4. Pt was pleasant and forthcoming with information. Pts mood appeared calm. Pt presented with thought process organized, speech clear. Pt denied SI/HI/AVH.         Counselor met with pt to discuss current treatment and progress. Requested pt to provide an overview of her treatment progress and introduced her to the other staff at the facility and provide support.     Pt came into this session sharing that things are much improved since the last time that we had met. She stated that she goes over to her boyfriend's house every weekend with their son, and this is less stressful than where she currently lives.  Pt shared that housing and transportation continue to be her two main issues. She stated that she has made progress on housing as she just heard back from [facility], and she was able to pick out one of their housing complexes and hopes to get there by the end of February. She stated that the cost would be $50 deposit and $50.00 a month. Although she received a voucher today, she was told that she needed to use her insurance  for transportation and she relayed the struggles she had with using that resource. Today however the voucher did work well for her to and from her appointment with no issues.     She relays that she now has a year sober and that her child Chauncey is also a year old. She brings the baby to each meeting who appears healthy, jovial, and their interactions are positive. She relays that she has no interest going to meetings, or getting involved in recovery as she is clean and has no desire to use. She displays poor insight into relapse prevention however has not tested positive for any illicit drugs. She did comply with a drug screen today for the NP. She stated that her boyfriend is also clean and sober and stopped smoking

## 2024-03-05 ENCOUNTER — HOSPITAL ENCOUNTER (OUTPATIENT)
Dept: PSYCHIATRY | Age: 24
Setting detail: THERAPIES SERIES
Discharge: HOME | End: 2024-03-05
Payer: MEDICAID

## 2024-03-05 ENCOUNTER — HOSPITAL ENCOUNTER (OUTPATIENT)
Dept: PSYCHIATRY | Age: 24
Setting detail: THERAPIES SERIES
Discharge: HOME | End: 2024-03-05

## 2024-03-05 DIAGNOSIS — F11.20 OPIOID DEPENDENCE ON AGONIST THERAPY (HCC): Primary | ICD-10-CM

## 2024-03-05 PROCEDURE — 99214 OFFICE O/P EST MOD 30 MIN: CPT

## 2024-03-05 RX ORDER — BUPRENORPHINE AND NALOXONE 8; 2 MG/1; MG/1
1 FILM, SOLUBLE BUCCAL; SUBLINGUAL 2 TIMES DAILY
Qty: 56 FILM | Refills: 0 | Status: SHIPPED | OUTPATIENT
Start: 2024-03-05 | End: 2024-04-02

## 2024-03-05 NOTE — PLAN OF CARE
Progress Note    This is a non-billable service      Date: 3/5/24  Start Time:  End Time:       Progress Note      This is a non-billable service     Date: 1/30/2024  Start Time: 12:15 PM  End Time: 12:45 PM     Mental Status Exam - Pt presented alert and oriented x4. Pt was pleasant and forthcoming with information. Pts mood appeared calm. Pt presented with thought process organized, speech clear. Pt denied SI/HI/AVH.      Counselor met with pt to discuss overall progress and discharge from the counseling portion of the program since the program ends at one-year post-partum. Her child Chauncey is a 13 months old.     Pt came into this session sharing that things are much improved since the last time that we had met. She stated that she continues to visit with her boyfriend every weekend with their son, and this is less stressful than where she currently lives.  Pt shared that housing and transportation continue to be her two main issues. She stated that she has made progress on housing as she just heard back from [facility], and she has paperwork to fill out and is in the 7th place on the waiting list.. She stated that the cost would be $50 deposit and $50.00 a month. She understands that she has to utilize her own insurance from now on for transportation and she plans to arrange her transportation with her family member to go to [facility] as she did before as the insurance with Medicaid is not reliable.     She continues to brings the baby to each meeting who appears healthy, jovial, and their interactions are positive. She  continues to relays that she has no interest going to meetings, or getting involved in recovery as she has been clean for over a year now and has no desire to use. She  continues to display poor insight into relapse prevention however has not tested positive for any illicit drugs. She stated that her boyfriend continues to be clean and sober. She was referred to our Tobacco Specialist

## 2024-03-05 NOTE — PROGRESS NOTES
face-to-face patient care, including:  and coordinating care, reviewing the chart, labs, and diagnostics, as well as medical decision making.

## 2024-04-01 ENCOUNTER — OFFICE VISIT (OUTPATIENT)
Age: 24
End: 2024-04-01
Payer: MEDICAID

## 2024-04-01 DIAGNOSIS — F11.20 OPIOID DEPENDENCE ON AGONIST THERAPY (HCC): ICD-10-CM

## 2024-04-01 LAB
AMPHETAMINE SCREEN URINE: NEGATIVE
BARBITURATE SCREEN URINE: NEGATIVE
BENZODIAZEPINE SCREEN, URINE: NEGATIVE
BUPRENORPHINE URINE: POSITIVE
CANNABINOID SCREEN URINE: POSITIVE
COCAINE METABOLITE, URINE: NEGATIVE
FENTANYL URINE: NEGATIVE
METHADONE SCREEN, URINE: NEGATIVE
OPIATES, URINE: NEGATIVE
OXYCODONE SCREEN URINE: NEGATIVE
PHENCYCLIDINE, URINE: NEGATIVE
TEST INFORMATION: ABNORMAL

## 2024-04-01 PROCEDURE — 99214 OFFICE O/P EST MOD 30 MIN: CPT

## 2024-04-01 PROCEDURE — 4004F PT TOBACCO SCREEN RCVD TLK: CPT

## 2024-04-01 PROCEDURE — G8420 CALC BMI NORM PARAMETERS: HCPCS

## 2024-04-01 PROCEDURE — G8428 CUR MEDS NOT DOCUMENT: HCPCS

## 2024-04-01 RX ORDER — BUPRENORPHINE AND NALOXONE 8; 2 MG/1; MG/1
1 FILM, SOLUBLE BUCCAL; SUBLINGUAL 2 TIMES DAILY
Qty: 56 FILM | Refills: 0 | Status: SHIPPED | OUTPATIENT
Start: 2024-04-01 | End: 2024-04-29

## 2024-04-02 LAB
ABNORMAL SPECIMEN VALIDITY TEST: NORMAL
BUPRENORPHINE, QUANTITATIVE, URINE: 75.7 NG/ML
COMPLIANCE DRUG ANALYSIS, URINE: NORMAL
INTEGRITY CHECK, CREATININE, URINE: 87.9 MG/DL (ref 22–250)
INTEGRITY CHECK, OXIDANT, URINE: <40 MG/L
INTEGRITY CHECK, PH, URINE: 6 (ref 4.5–9)
INTEGRITY CHECK, SPECIFIC GRAVITY, URINE: 1.01 (ref 1–1.03)
NALOXONE, QUANTITATIVE, URINE: 77 NG/ML
NORBUPRENORPHINE, QUANTITATIVE, URINE: 165.1 NG/ML
THC NORMALIZED, QUANTITIATIVE, URINE: 46.1 NG/ML
THC-COOH, QUANTITATIVE, URINE: 40.5 NG/ML

## 2024-04-30 NOTE — PROGRESS NOTES
the evening. Do all this for 14 days. Max Daily Amount: 2 Film.    famotidine (PEPCID) 20 MG tablet Take 1 tablet by mouth 2 times daily      PDMP   04/03/2024 04/01/2024 3 Buprenorphine-Nalox 8-2mg Film 56.00 28 Lo Vela [id number] Rit (1984) 0 16.00 mg Medicaid [place]   03/05/2024 03/05/2024 3 Buprenorphine-Nalox 8-2mg Film 56.00 28 Lo Vela [id number] Rit (1984) 0 16.00 mg Medicaid [place]   02/05/2024 01/30/2024 3 Buprenorphine-Nalox 8-2mg Film 70.00 28 Lo Vela [id number] Rit (1984) 0 20.00 mg Medicaid [place]     LABS:   0 Result Notes  Component  Ref Range & Units 4/1/24 1230 11/29/23 1144 11/1/23 1240 10/18/23 1055 10/9/23 1942 9/26/23 1130 9/13/23 1205   Amphetamine Screen, Ur  NEGATIVE NEGATIVE NEGATIVE CM NEGATIVE CM NEGATIVE CM NEGATIVE CM NEGATIVE CM NEGATIVE CM   Comment: Cutoff: 1000 ng/mL   Barbiturate Screen, Ur  NEGATIVE NEGATIVE NEGATIVE CM NEGATIVE CM NEGATIVE CM NEGATIVE CM NEGATIVE CM NEGATIVE CM   Comment: Cutoff: 200 ng/ml   Cocaine Metabolite, Urine  NEGATIVE NEGATIVE NEGATIVE CM NEGATIVE CM NEGATIVE CM NEGATIVE CM NEGATIVE CM POSITIVE Abnormal  CM   Comment: Cutoff: 300 ng/ml   Benzodiazepine Screen, Urine  NEGATIVE NEGATIVE NEGATIVE CM NEGATIVE CM NEGATIVE CM NEGATIVE CM NEGATIVE CM NEGATIVE CM   Comment: Cutoff: 200 ng/ml   Buprenorphine Urine  NEGATIVE POSITIVE Abnormal  POSITIVE Abnormal  CM POSITIVE Abnormal  CM POSITIVE Abnormal  CM POSITIVE Abnormal  CM POSITIVE Abnormal  CM POSITIVE Abnormal  CM   Comment: Cutoff: 5 ng/ml   Methadone Screen, Urine  NEGATIVE NEGATIVE NEGATIVE CM NEGATIVE CM NEGATIVE CM NEGATIVE CM NEGATIVE CM NEGATIVE CM   Comment: Cutoff: 300 ng/ml   Opiates, Urine  NEGATIVE NEGATIVE NEGATIVE CM NEGATIVE CM NEGATIVE CM NEGATIVE CM NEGATIVE CM NEGATIVE CM   Comment: Cutoff: 300 ng/ml Note: The Opiate screen is not intended to detect Oxycodone.   Phencyclidine, Urine  NEGATIVE NEGATIVE NEGATIVE CM NEGATIVE CM NEGATIVE CM NEGATIVE CM NEGATIVE CM NEGATIVE CM   Comment: Cutoff: 25 ng/ml

## 2024-05-01 ENCOUNTER — OFFICE VISIT (OUTPATIENT)
Age: 24
End: 2024-05-01
Payer: MEDICAID

## 2024-05-01 DIAGNOSIS — F11.20 OPIOID DEPENDENCE ON AGONIST THERAPY (HCC): Primary | ICD-10-CM

## 2024-05-01 PROCEDURE — 99214 OFFICE O/P EST MOD 30 MIN: CPT

## 2024-05-01 PROCEDURE — 4004F PT TOBACCO SCREEN RCVD TLK: CPT

## 2024-05-01 PROCEDURE — G8428 CUR MEDS NOT DOCUMENT: HCPCS

## 2024-05-01 PROCEDURE — G8420 CALC BMI NORM PARAMETERS: HCPCS

## 2024-05-01 RX ORDER — BUPRENORPHINE AND NALOXONE 8; 2 MG/1; MG/1
1 FILM, SOLUBLE BUCCAL; SUBLINGUAL 2 TIMES DAILY
Qty: 56 FILM | Refills: 0 | Status: SHIPPED | OUTPATIENT
Start: 2024-05-01 | End: 2024-05-29

## 2024-05-16 ENCOUNTER — OFFICE VISIT (OUTPATIENT)
Age: 24
End: 2024-05-16
Payer: MEDICAID

## 2024-05-16 VITALS
HEART RATE: 76 BPM | BODY MASS INDEX: 28.49 KG/M2 | SYSTOLIC BLOOD PRESSURE: 107 MMHG | HEIGHT: 65 IN | WEIGHT: 171 LBS | DIASTOLIC BLOOD PRESSURE: 69 MMHG

## 2024-05-16 DIAGNOSIS — Z01.419 ENCOUNTER FOR ANNUAL ROUTINE GYNECOLOGICAL EXAMINATION: ICD-10-CM

## 2024-05-16 DIAGNOSIS — N89.8 VAGINAL ITCHING: ICD-10-CM

## 2024-05-16 DIAGNOSIS — N91.2 AMENORRHEA: Primary | ICD-10-CM

## 2024-05-16 LAB
CONTROL: PRESENT
PREGNANCY TEST URINE, POC: NEGATIVE

## 2024-05-16 PROCEDURE — 99395 PREV VISIT EST AGE 18-39: CPT | Performed by: OBSTETRICS & GYNECOLOGY

## 2024-05-16 PROCEDURE — 81025 URINE PREGNANCY TEST: CPT | Performed by: OBSTETRICS & GYNECOLOGY

## 2024-05-16 RX ORDER — PNV NO.95/FERROUS FUM/FOLIC AC 28MG-0.8MG
1 TABLET ORAL DAILY
Qty: 90 TABLET | Refills: 3 | Status: SHIPPED | OUTPATIENT
Start: 2024-05-16

## 2024-05-16 ASSESSMENT — ENCOUNTER SYMPTOMS
VOMITING: 0
BLOOD IN STOOL: 0
ABDOMINAL PAIN: 0
SHORTNESS OF BREATH: 0
WHEEZING: 0
CONSTIPATION: 1
NAUSEA: 0
DIARRHEA: 0
COUGH: 0

## 2024-05-16 ASSESSMENT — PATIENT HEALTH QUESTIONNAIRE - PHQ9
SUM OF ALL RESPONSES TO PHQ QUESTIONS 1-9: 0
SUM OF ALL RESPONSES TO PHQ QUESTIONS 1-9: 0
2. FEELING DOWN, DEPRESSED OR HOPELESS: NOT AT ALL
SUM OF ALL RESPONSES TO PHQ QUESTIONS 1-9: 0
1. LITTLE INTEREST OR PLEASURE IN DOING THINGS: NOT AT ALL
SUM OF ALL RESPONSES TO PHQ QUESTIONS 1-9: 0
SUM OF ALL RESPONSES TO PHQ9 QUESTIONS 1 & 2: 0

## 2024-05-17 DIAGNOSIS — N89.8 VAGINAL ITCHING: ICD-10-CM

## 2024-05-17 RX ORDER — DOXYCYCLINE HYCLATE 100 MG
100 TABLET ORAL 2 TIMES DAILY
Qty: 14 TABLET | Refills: 0 | Status: SHIPPED | OUTPATIENT
Start: 2024-05-17 | End: 2024-05-24

## 2024-05-17 RX ORDER — METRONIDAZOLE 500 MG/1
500 TABLET ORAL 2 TIMES DAILY
Qty: 14 TABLET | Refills: 0 | Status: SHIPPED | OUTPATIENT
Start: 2024-05-17 | End: 2024-05-24

## 2024-05-18 NOTE — PROGRESS NOTES
Bv and ureaplasm rx sent  
Pt here for annual exam.  Reports vaginal itching and discharge x2 weeks.  Has had these symptoms reoccurring and PCP has treated without resolution.  Pt requesting pregnancy test as last menses was only 2 days in length.  Urine HCG test preformed and negative.       
Type  Cigarettes since 1/1/2013   Pack Year History     Packs/Day From To Years    0.3 1/1/2013  11.4      Smokeless Tobacco Use  Never              Alcohol History       Alcohol Use Status  No              Drug Use       Drug Use Status  Yes Types  Marijuana (Weed) Frequency   4 times/week Comment  most days              Sexual Activity       Sexually Active  Yes Partners  Male                     Review of Systems   Constitutional:  Negative for fever.   HENT:  Negative for hearing loss.    Eyes:  Negative for visual disturbance.   Respiratory:  Negative for cough, shortness of breath and wheezing.    Cardiovascular:  Negative for chest pain and palpitations.   Gastrointestinal:  Positive for constipation. Negative for abdominal pain, blood in stool, diarrhea, nausea and vomiting.   Genitourinary:  Positive for vaginal discharge. Negative for dysuria, enuresis, frequency, hematuria and urgency.   Musculoskeletal:  Negative for arthralgias and myalgias.   Skin:  Negative for rash.   Neurological:  Negative for headaches.   Hematological:  Does not bruise/bleed easily.   Psychiatric/Behavioral:  Negative for agitation, dysphoric mood, self-injury, sleep disturbance and suicidal ideas. The patient is not nervous/anxious.         Vitals:    05/16/24 0855   BP: 107/69   Pulse: 76        Physical Exam  Genitourinary:      Bladder and urethral meatus normal.      Right Labia: No Bartholin's cyst.     Left Labia: No Bartholin's cyst.     No vaginal discharge or bleeding.      No vaginal prolapse present.     No vaginal atrophy present.       Right Adnexa: not tender and no mass present.     Left Adnexa: not tender and no mass present.     No cervical discharge, friability or lesion.      Uterus is not enlarged, tender, irregular or prolapsed.      No urethral prolapse present.      Levator ani not tender and obturator internus not tender.  Breasts:     Right: No inverted nipple, mass, nipple discharge, skin change or 
abdominal pain

## 2024-05-20 ENCOUNTER — TELEPHONE (OUTPATIENT)
Age: 24
End: 2024-05-20

## 2024-05-20 NOTE — TELEPHONE ENCOUNTER
----- Message from Rhonda Guthrie DO sent at 5/17/2024  8:44 PM EDT -----  Two infections ureaplasm and bv two different antibiotics to address both sent to pharmacy
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
## 2024-05-30 ENCOUNTER — OFFICE VISIT (OUTPATIENT)
Age: 24
End: 2024-05-30
Payer: MEDICAID

## 2024-05-30 DIAGNOSIS — F11.20 OPIOID DEPENDENCE ON AGONIST THERAPY (HCC): ICD-10-CM

## 2024-05-30 PROCEDURE — G8427 DOCREV CUR MEDS BY ELIG CLIN: HCPCS

## 2024-05-30 PROCEDURE — G8419 CALC BMI OUT NRM PARAM NOF/U: HCPCS

## 2024-05-30 PROCEDURE — 4004F PT TOBACCO SCREEN RCVD TLK: CPT

## 2024-05-30 PROCEDURE — 99214 OFFICE O/P EST MOD 30 MIN: CPT

## 2024-05-30 RX ORDER — BUPRENORPHINE AND NALOXONE 8; 2 MG/1; MG/1
1 FILM, SOLUBLE BUCCAL; SUBLINGUAL 2 TIMES DAILY
Qty: 56 FILM | Refills: 0 | Status: SHIPPED | OUTPATIENT
Start: 2024-05-30 | End: 2024-06-27

## 2024-06-13 DIAGNOSIS — N91.2 AMENORRHEA: Primary | ICD-10-CM

## 2024-07-01 ENCOUNTER — INITIAL PRENATAL (OUTPATIENT)
Dept: OBGYN CLINIC | Age: 24
End: 2024-07-01
Payer: MEDICAID

## 2024-07-01 ENCOUNTER — ANCILLARY PROCEDURE (OUTPATIENT)
Dept: OBGYN CLINIC | Age: 24
End: 2024-07-01
Payer: MEDICAID

## 2024-07-01 VITALS
BODY MASS INDEX: 28.46 KG/M2 | WEIGHT: 171 LBS | HEART RATE: 87 BPM | SYSTOLIC BLOOD PRESSURE: 105 MMHG | DIASTOLIC BLOOD PRESSURE: 64 MMHG

## 2024-07-01 DIAGNOSIS — Z34.90 SECOND PREGNANCY: ICD-10-CM

## 2024-07-01 DIAGNOSIS — Z3A.01 7 WEEKS GESTATION OF PREGNANCY: ICD-10-CM

## 2024-07-01 DIAGNOSIS — Z34.90 PREGNANCY, UNSPECIFIED GESTATIONAL AGE: Primary | ICD-10-CM

## 2024-07-01 DIAGNOSIS — F17.210 CIGARETTE SMOKER: ICD-10-CM

## 2024-07-01 PROBLEM — R82.5 POSITIVE URINE DRUG SCREEN: Status: RESOLVED | Noted: 2023-01-29 | Resolved: 2024-07-01

## 2024-07-01 PROBLEM — F11.20 OPIOID DEPENDENCE ON AGONIST THERAPY (HCC): Status: RESOLVED | Noted: 2023-02-15 | Resolved: 2024-07-01

## 2024-07-01 LAB
GLUCOSE URINE, POC: NORMAL
PROTEIN UA: NEGATIVE

## 2024-07-01 PROCEDURE — 76817 TRANSVAGINAL US OBSTETRIC: CPT | Performed by: OBSTETRICS & GYNECOLOGY

## 2024-07-01 PROCEDURE — 99203 OFFICE O/P NEW LOW 30 MIN: CPT | Performed by: OBSTETRICS & GYNECOLOGY

## 2024-07-01 PROCEDURE — 99999 PR OFFICE/OUTPT VISIT,PROCEDURE ONLY: CPT | Performed by: OBSTETRICS & GYNECOLOGY

## 2024-07-01 PROCEDURE — 81002 URINALYSIS NONAUTO W/O SCOPE: CPT | Performed by: OBSTETRICS & GYNECOLOGY

## 2024-07-01 PROCEDURE — 76801 OB US < 14 WKS SINGLE FETUS: CPT | Performed by: OBSTETRICS & GYNECOLOGY

## 2024-07-01 RX ORDER — BUPRENORPHINE AND NALOXONE 8; 2 MG/1; MG/1
1 FILM, SOLUBLE BUCCAL; SUBLINGUAL 2 TIMES DAILY
COMMUNITY
End: 2024-07-03 | Stop reason: SDUPTHER

## 2024-07-01 NOTE — PROGRESS NOTES
Patient is here today for dating. Has vaginal bleeding after intercourse. Had some cramping in beginning of pregnancy.   
MEDICAL HISTORY:   Family History   Problem Relation Age of Onset    No Known Problems Mother     No Known Problems Father     Diabetes Maternal Grandmother                   PHYSICAL EXAMINATION:  /64   Pulse 87   Wt 77.6 kg (171 lb)   LMP 2024 (Exact Date)   Body mass index is 28.46 kg/m².    Urine dipstick:  Results for POC orders placed in visit on 24   POCT urine qual dipstick protein   Result Value Ref Range    Protein, UA Negative Negative   POCT urine qual dipstick glucose   Result Value Ref Range    Glucose, UA POC neg         A/an viability ultrasound was done in our office today. Please refer to the enclosed copy of the ultrasound report for further information.    Discussion:  There is a lester fetus and an intrauterine gestation.  Fetal cardiac activity is detected and appears to be normal.  Crown-rump measurement was consistent with last menstrual period dating.  The placenta is grade 0.  Amniotic fluid volume is normal.  Yolk sac is identified    IMPRESSION:  1. Single intrauterine gestation at 7+ weeks with confirmed heartbeat.   2. The amniotic fluid volume is normal and the placenta is grade 0.    RECOMMENDATIONS:  Each of the recommendations were discussed with the patient:  1.  Return in 5 weeks if NT desired.  2.  Return at 20 weeks for level 2 anatomy scan.  3.  Follow-up would be otherwise as clinically indicated.    The patient is to continue to follow with you in your office for ongoing obstetric care.     PLAN:    As noted above or sooner prn.    Sincerely,        Iban Knight MD

## 2024-07-03 ENCOUNTER — OFFICE VISIT (OUTPATIENT)
Age: 24
End: 2024-07-03
Payer: MEDICAID

## 2024-07-03 DIAGNOSIS — F11.20 OPIOID DEPENDENCE ON AGONIST THERAPY (HCC): Primary | ICD-10-CM

## 2024-07-03 LAB
AMPHETAMINE SCREEN URINE: NEGATIVE
BARBITURATE SCREEN URINE: NEGATIVE
BENZODIAZEPINE SCREEN, URINE: NEGATIVE
BUPRENORPHINE URINE: POSITIVE
CANNABINOID SCREEN URINE: NEGATIVE
COCAINE METABOLITE, URINE: NEGATIVE
FENTANYL URINE: NEGATIVE
METHADONE SCREEN, URINE: NEGATIVE
OPIATES, URINE: NEGATIVE
OXYCODONE SCREEN URINE: NEGATIVE
PCP,URINE: NEGATIVE
TEST INFORMATION: ABNORMAL

## 2024-07-03 PROCEDURE — G8419 CALC BMI OUT NRM PARAM NOF/U: HCPCS

## 2024-07-03 PROCEDURE — 4004F PT TOBACCO SCREEN RCVD TLK: CPT

## 2024-07-03 PROCEDURE — G8427 DOCREV CUR MEDS BY ELIG CLIN: HCPCS

## 2024-07-03 PROCEDURE — 99214 OFFICE O/P EST MOD 30 MIN: CPT

## 2024-07-03 RX ORDER — BUPRENORPHINE AND NALOXONE 8; 2 MG/1; MG/1
1 FILM, SOLUBLE BUCCAL; SUBLINGUAL 2 TIMES DAILY
Qty: 60 FILM | Refills: 0 | Status: SHIPPED | OUTPATIENT
Start: 2024-07-03 | End: 2024-08-02

## 2024-07-03 NOTE — PROGRESS NOTES
the tongue in the morning and 1 Film in the evening. Do all this for 14 days. Max Daily Amount: 2 Film.    famotidine (PEPCID) 20 MG tablet Take 1 tablet by mouth 2 times daily      [facility]   05/01/2024 05/01/2024 2 Buprenorphine-Nalox 8-2mg Film 56.00 28 Lo Vela [id number] Rit (1984) 0 16.00 mg Medicaid [place]   04/03/2024 04/01/2024 2 Buprenorphine-Nalox 8-2mg Film 56.00 28 Lo Vela [id number] Rit (1984) 0 16.00 mg [place]   03/05/2024 03/05/2024 2 Buprenorphine-Nalox 8-2mg Film 56.00 28 Lo Vela [id number] Rit (1984) 0 16.00 mg [place]   02/05/2024 01/30/2024 2 Buprenorphine-Nalox 8-2mg Film 70.00 28 Lo Vela [id number] Rit (1984)         LABS:   0 Result Notes  Component  Ref Range & Units 4/1/24 1230 11/29/23 1144 11/1/23 1240 10/18/23 1055 10/9/23 1942 9/26/23 1130 9/13/23 1205   Amphetamine Screen, Ur  NEGATIVE NEGATIVE NEGATIVE CM NEGATIVE CM NEGATIVE CM NEGATIVE CM NEGATIVE CM NEGATIVE CM   Comment: Cutoff: 1000 ng/mL   Barbiturate Screen, Ur  NEGATIVE NEGATIVE NEGATIVE CM NEGATIVE CM NEGATIVE CM NEGATIVE CM NEGATIVE CM NEGATIVE CM   Comment: Cutoff: 200 ng/ml   Cocaine Metabolite, Urine  NEGATIVE NEGATIVE NEGATIVE CM NEGATIVE CM NEGATIVE CM NEGATIVE CM NEGATIVE CM POSITIVE Abnormal  CM   Comment: Cutoff: 300 ng/ml   Benzodiazepine Screen, Urine  NEGATIVE NEGATIVE NEGATIVE CM NEGATIVE CM NEGATIVE CM NEGATIVE CM NEGATIVE CM NEGATIVE CM   Comment: Cutoff: 200 ng/ml   Buprenorphine Urine  NEGATIVE POSITIVE Abnormal  POSITIVE Abnormal  CM POSITIVE Abnormal  CM POSITIVE Abnormal  CM POSITIVE Abnormal  CM POSITIVE Abnormal  CM POSITIVE Abnormal  CM   Comment: Cutoff: 5 ng/ml   Methadone Screen, Urine  NEGATIVE NEGATIVE NEGATIVE CM NEGATIVE CM NEGATIVE CM NEGATIVE CM NEGATIVE CM NEGATIVE CM   Comment: Cutoff: 300 ng/ml   Opiates, Urine  NEGATIVE NEGATIVE NEGATIVE CM NEGATIVE CM NEGATIVE CM NEGATIVE CM NEGATIVE CM NEGATIVE CM   Comment: Cutoff: 300 ng/ml Note: The Opiate screen is not intended to detect Oxycodone.   Phencyclidine,

## 2024-07-05 LAB
ABNORMAL SPECIMEN VALIDITY TEST: ABNORMAL
BUPRENORPHINE, QUANTITATIVE, URINE: 249 NG/ML
COMPLIANCE DRUG ANALYSIS, URINE: NORMAL
INTEGRITY CHECK, CREATININE, URINE: 306.8 MG/DL (ref 22–250)
INTEGRITY CHECK, OXIDANT, URINE: <40 MG/L
INTEGRITY CHECK, PH, URINE: 5.5 (ref 4.5–9)
INTEGRITY CHECK, SPECIFIC GRAVITY, URINE: 1.03 (ref 1–1.03)
NALOXONE, QUANTITATIVE, URINE: 749.7 NG/ML
NORBUPRENORPHINE, QUANTITATIVE, URINE: 463 NG/ML
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
## 2024-07-29 ENCOUNTER — OFFICE VISIT (OUTPATIENT)
Age: 24
End: 2024-07-29
Payer: MEDICAID

## 2024-07-29 DIAGNOSIS — F11.20 OPIOID DEPENDENCE ON AGONIST THERAPY (HCC): ICD-10-CM

## 2024-07-29 PROCEDURE — G8419 CALC BMI OUT NRM PARAM NOF/U: HCPCS

## 2024-07-29 PROCEDURE — 4004F PT TOBACCO SCREEN RCVD TLK: CPT

## 2024-07-29 PROCEDURE — G8428 CUR MEDS NOT DOCUMENT: HCPCS

## 2024-07-29 PROCEDURE — 99214 OFFICE O/P EST MOD 30 MIN: CPT

## 2024-07-29 RX ORDER — BUPRENORPHINE AND NALOXONE 8; 2 MG/1; MG/1
1 FILM, SOLUBLE BUCCAL; SUBLINGUAL 2 TIMES DAILY
Qty: 60 FILM | Refills: 0 | Status: SHIPPED | OUTPATIENT
Start: 2024-07-29 | End: 2024-08-28

## 2024-08-19 ENCOUNTER — OFFICE VISIT (OUTPATIENT)
Age: 24
End: 2024-08-19
Payer: MEDICAID

## 2024-08-19 DIAGNOSIS — F11.20 OPIOID DEPENDENCE ON AGONIST THERAPY (HCC): ICD-10-CM

## 2024-08-19 PROCEDURE — 99214 OFFICE O/P EST MOD 30 MIN: CPT

## 2024-08-19 PROCEDURE — G8419 CALC BMI OUT NRM PARAM NOF/U: HCPCS

## 2024-08-19 PROCEDURE — 4004F PT TOBACCO SCREEN RCVD TLK: CPT

## 2024-08-19 PROCEDURE — G8428 CUR MEDS NOT DOCUMENT: HCPCS

## 2024-08-19 RX ORDER — BUPRENORPHINE AND NALOXONE 8; 2 MG/1; MG/1
1 FILM, SOLUBLE BUCCAL; SUBLINGUAL 2 TIMES DAILY
Qty: 60 FILM | Refills: 0 | Status: SHIPPED | OUTPATIENT
Start: 2024-08-28 | End: 2024-09-27
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
## 2024-10-02 ENCOUNTER — ANCILLARY PROCEDURE (OUTPATIENT)
Dept: OBGYN CLINIC | Age: 24
End: 2024-10-02
Payer: MEDICAID

## 2024-10-02 ENCOUNTER — ROUTINE PRENATAL (OUTPATIENT)
Dept: OBGYN CLINIC | Age: 24
End: 2024-10-02
Payer: MEDICAID

## 2024-10-02 VITALS
BODY MASS INDEX: 31.12 KG/M2 | DIASTOLIC BLOOD PRESSURE: 64 MMHG | SYSTOLIC BLOOD PRESSURE: 105 MMHG | HEART RATE: 96 BPM | WEIGHT: 187 LBS

## 2024-10-02 DIAGNOSIS — Z34.90 PREGNANCY, UNSPECIFIED GESTATIONAL AGE: ICD-10-CM

## 2024-10-02 DIAGNOSIS — O09.892 HX OF PRETERM DELIVERY, CURRENTLY PREGNANT, SECOND TRIMESTER: ICD-10-CM

## 2024-10-02 DIAGNOSIS — Z36.9 ENCOUNTER FOR FETAL ULTRASOUND: Primary | ICD-10-CM

## 2024-10-02 DIAGNOSIS — Z36.89 ENCOUNTER FOR FETAL ANATOMIC SURVEY: ICD-10-CM

## 2024-10-02 PROBLEM — Z3A.20 20 WEEKS GESTATION OF PREGNANCY: Status: ACTIVE | Noted: 2024-07-01

## 2024-10-02 LAB
GLUCOSE URINE, POC: NORMAL MG/DL
PROTEIN UA: NEGATIVE

## 2024-10-02 PROCEDURE — 99213 OFFICE O/P EST LOW 20 MIN: CPT | Performed by: OBSTETRICS & GYNECOLOGY

## 2024-10-02 PROCEDURE — 99999 PR OFFICE/OUTPT VISIT,PROCEDURE ONLY: CPT | Performed by: OBSTETRICS & GYNECOLOGY

## 2024-10-02 PROCEDURE — 81002 URINALYSIS NONAUTO W/O SCOPE: CPT | Performed by: OBSTETRICS & GYNECOLOGY

## 2024-10-02 PROCEDURE — 76811 OB US DETAILED SNGL FETUS: CPT | Performed by: OBSTETRICS & GYNECOLOGY

## 2024-10-02 PROCEDURE — 76817 TRANSVAGINAL US OBSTETRIC: CPT | Performed by: OBSTETRICS & GYNECOLOGY

## 2024-10-02 NOTE — PROGRESS NOTES
Patient is here today for 20 wk ultrasound. Getting headaches a lot. Denies any vaginal bleeding, cramping, or leakage of fluids.  Hip pain, worse when she gets up.  Patient reports good fetal movement.

## 2024-10-02 NOTE — PROGRESS NOTES
[facility] [place] [facility]  [address] [place] [address]  [place] [address]  Dept: [phone]  Loc: [phone]     10/2/2024    RE:  Dionna Muniz     : 2000   AGE: 23 y.o.     Dear Dr. Guthrie,    Thank you for allowing me to see Dionna Muniz.  As I'm sure you will recall, Dionna Muniz is a 23 y.o. [id number]'s last menstrual period was 2024 (exact date). Estimated Date of Delivery: 25 at 20w6d seen in our office today for the following:    REASON FOR VISIT: Level II    Patient Active Problem List    Diagnosis Date Noted    Marijuana use 2023    Hx of  delivery, currently pregnant, second trimester 10/02/2024    20 weeks gestation of pregnancy 2024    Second pregnancy 2024    Cigarette smoker 2024        PAST HISTORY:  OB History    Para Term  AB Living   2 1   1   1   SAB IAB Ectopic Molar Multiple Live Births           0 1      # Outcome Date GA Lbr Russell/2nd Weight Sex Type Anes PTL Lv   2 Current            1  23 33w4d 09:15 / 00:22 2.18 kg (4 lb 12.9 oz) M Vag-Spont EPI Y MELO      Complications:  Labor, Drug abuse, daily use (HCC)          MEDICAL:  Past Medical History:   Diagnosis Date    GERD (gastroesophageal reflux disease)     Mild opioid use disorder, in sustained remission ([facility])     Opioid dependence on agonist therapy ([facility]) 02/15/2023    Positive urine drug screen (cannabinoids and fentanyl) 2023        SURGICAL:  No past surgical history on file.    ALLERGIES:    Allergies   Allergen Reactions    Dust Mite Mixed Allergen Ext [Mite (D. Farinae)] Itching    Pollen Extract-Tree Extract [Pollen Extract]          MEDICATIONS:    Current Outpatient Medications   Medication Sig Dispense Refill    Prenatal Vit-Fe Fumarate-FA (PRENATAL VITAMINS) 28-0.8 MG TABS Take 1 tablet by mouth daily 90 tablet 3    famotidine (PEPCID) 20 MG tablet Take 1 tablet

## 2024-10-08 ENCOUNTER — INITIAL PRENATAL (OUTPATIENT)
Age: 24
End: 2024-10-08
Payer: MEDICAID

## 2024-10-08 ENCOUNTER — HOSPITAL ENCOUNTER (OUTPATIENT)
Age: 24
Discharge: HOME | End: 2024-10-08
Payer: MEDICAID

## 2024-10-08 VITALS
WEIGHT: 188 LBS | SYSTOLIC BLOOD PRESSURE: 100 MMHG | BODY MASS INDEX: 31.28 KG/M2 | DIASTOLIC BLOOD PRESSURE: 62 MMHG | HEART RATE: 84 BPM

## 2024-10-08 DIAGNOSIS — Z34.81 ENCOUNTER FOR SUPERVISION OF OTHER NORMAL PREGNANCY, FIRST TRIMESTER: ICD-10-CM

## 2024-10-08 DIAGNOSIS — O36.1920: ICD-10-CM

## 2024-10-08 DIAGNOSIS — Z3A.21 21 WEEKS GESTATION OF PREGNANCY: Primary | ICD-10-CM

## 2024-10-08 DIAGNOSIS — Z3A.21 21 WEEKS GESTATION OF PREGNANCY: ICD-10-CM

## 2024-10-08 LAB
ABO + RH BLD: NORMAL
ALBUMIN SERPL-MCNC: 4 G/DL (ref 3.5–5.2)
ALP SERPL-CCNC: 75 U/L (ref 35–104)
ALT SERPL-CCNC: 10 U/L (ref 0–32)
AMPHET UR QL SCN: NEGATIVE
ANION GAP SERPL CALCULATED.3IONS-SCNC: 10 MMOL/L (ref 7–16)
ANTIBODY IDENTIFICATION: NORMAL
AST SERPL-CCNC: 13 U/L (ref 0–31)
BARBITURATES UR QL SCN: NEGATIVE
BENZODIAZ UR QL: NEGATIVE
BILIRUB SERPL-MCNC: <0.2 MG/DL (ref 0–1.2)
BLOOD BANK SAMPLE EXPIRATION: NORMAL
BLOOD GROUP ANTIBODIES SERPL: POSITIVE
BUN SERPL-MCNC: 10 MG/DL (ref 6–20)
BUPRENORPHINE UR QL: POSITIVE
CALCIUM SERPL-MCNC: 8.6 MG/DL (ref 8.6–10.2)
CANNABINOIDS UR QL SCN: NEGATIVE
CHLORIDE SERPL-SCNC: 103 MMOL/L (ref 98–107)
CO2 SERPL-SCNC: 23 MMOL/L (ref 22–29)
COCAINE UR QL SCN: NEGATIVE
CREAT SERPL-MCNC: 0.6 MG/DL (ref 0.5–1)
DAT IGG: NEGATIVE
ERYTHROCYTE [DISTWIDTH] IN BLOOD BY AUTOMATED COUNT: 13.2 % (ref 11.5–15)
FENTANYL UR QL: NEGATIVE
GFR, ESTIMATED: >90 ML/MIN/1.73M2
GLUCOSE SERPL-MCNC: 80 MG/DL (ref 74–99)
GLUCOSE URINE, POC: NEGATIVE MG/DL
HBA1C MFR BLD: 5.3 % (ref 4–5.6)
HCT VFR BLD AUTO: 36.1 % (ref 34–48)
HGB BLD-MCNC: 11.8 G/DL (ref 11.5–15.5)
MCH RBC QN AUTO: 31.5 PG (ref 26–35)
MCHC RBC AUTO-ENTMCNC: 32.7 G/DL (ref 32–34.5)
MCV RBC AUTO: 96.3 FL (ref 80–99.9)
METHADONE UR QL: NEGATIVE
OPIATES UR QL SCN: NEGATIVE
OXYCODONE UR QL SCN: NEGATIVE
PCP UR QL SCN: NEGATIVE
PLATELET # BLD AUTO: 347 K/UL (ref 130–450)
PMV BLD AUTO: 9.6 FL (ref 7–12)
POTASSIUM SERPL-SCNC: 4.2 MMOL/L (ref 3.5–5)
PROT SERPL-MCNC: 6.5 G/DL (ref 6.4–8.3)
PROTEIN UA: NEGATIVE
RBC # BLD AUTO: 3.75 M/UL (ref 3.5–5.5)
SODIUM SERPL-SCNC: 136 MMOL/L (ref 132–146)
TEST INFORMATION: ABNORMAL
WBC OTHER # BLD: 12.2 K/UL (ref 4.5–11.5)

## 2024-10-08 PROCEDURE — 81002 URINALYSIS NONAUTO W/O SCOPE: CPT | Performed by: OBSTETRICS & GYNECOLOGY

## 2024-10-08 PROCEDURE — 86901 BLOOD TYPING SEROLOGIC RH(D): CPT

## 2024-10-08 PROCEDURE — 99213 OFFICE O/P EST LOW 20 MIN: CPT | Performed by: OBSTETRICS & GYNECOLOGY

## 2024-10-08 PROCEDURE — 36415 COLL VENOUS BLD VENIPUNCTURE: CPT

## 2024-10-08 PROCEDURE — 83036 HEMOGLOBIN GLYCOSYLATED A1C: CPT

## 2024-10-08 PROCEDURE — 86900 BLOOD TYPING SEROLOGIC ABO: CPT

## 2024-10-08 PROCEDURE — 86592 SYPHILIS TEST NON-TREP QUAL: CPT

## 2024-10-08 PROCEDURE — 86803 HEPATITIS C AB TEST: CPT

## 2024-10-08 PROCEDURE — 86850 RBC ANTIBODY SCREEN: CPT

## 2024-10-08 PROCEDURE — H1000 PRENATAL CARE ATRISK ASSESSM: HCPCS | Performed by: OBSTETRICS & GYNECOLOGY

## 2024-10-08 PROCEDURE — 87340 HEPATITIS B SURFACE AG IA: CPT

## 2024-10-08 PROCEDURE — 87077 CULTURE AEROBIC IDENTIFY: CPT

## 2024-10-08 PROCEDURE — 80053 COMPREHEN METABOLIC PANEL: CPT

## 2024-10-08 PROCEDURE — 87086 URINE CULTURE/COLONY COUNT: CPT

## 2024-10-08 PROCEDURE — 86880 COOMBS TEST DIRECT: CPT

## 2024-10-08 PROCEDURE — 85027 COMPLETE CBC AUTOMATED: CPT

## 2024-10-08 PROCEDURE — 86870 RBC ANTIBODY IDENTIFICATION: CPT

## 2024-10-08 PROCEDURE — 80307 DRUG TEST PRSMV CHEM ANLYZR: CPT

## 2024-10-08 PROCEDURE — 86787 VARICELLA-ZOSTER ANTIBODY: CPT

## 2024-10-08 PROCEDURE — 87389 HIV-1 AG W/HIV-1&-2 AB AG IA: CPT

## 2024-10-08 RX ORDER — FOLIC ACID 1 MG/1
1 TABLET ORAL DAILY
Qty: 60 TABLET | Refills: 5 | Status: SHIPPED | OUTPATIENT
Start: 2024-10-08

## 2024-10-08 RX ORDER — PNV NO.95/FERROUS FUM/FOLIC AC 28MG-0.8MG
1 TABLET ORAL DAILY
Qty: 60 TABLET | Refills: 5 | Status: SHIPPED | OUTPATIENT
Start: 2024-10-08

## 2024-10-08 RX ORDER — BUPRENORPHINE AND NALOXONE 8; 2 MG/1; MG/1
FILM, SOLUBLE BUCCAL; SUBLINGUAL
COMMUNITY
Start: 2024-09-27

## 2024-10-08 SDOH — ECONOMIC STABILITY: INCOME INSECURITY: HOW HARD IS IT FOR YOU TO PAY FOR THE VERY BASICS LIKE FOOD, HOUSING, MEDICAL CARE, AND HEATING?: NOT VERY HARD

## 2024-10-08 SDOH — ECONOMIC STABILITY: FOOD INSECURITY: WITHIN THE PAST 12 MONTHS, YOU WORRIED THAT YOUR FOOD WOULD RUN OUT BEFORE YOU GOT MONEY TO BUY MORE.: NEVER TRUE

## 2024-10-08 SDOH — ECONOMIC STABILITY: FOOD INSECURITY: WITHIN THE PAST 12 MONTHS, THE FOOD YOU BOUGHT JUST DIDN'T LAST AND YOU DIDN'T HAVE MONEY TO GET MORE.: NEVER TRUE

## 2024-10-08 NOTE — PROGRESS NOTES
Pt presents for initial prenatal visit.  LMP was 5/9/24  Dating US preformed with MFM 10/2/24  Last pap preformed 11/28/22  Urine negative for glucose and protein   Fetal ht: 148  Pt denies cramping, bleeding, or leaking of fluids.   Pharmacy and medications reviewed and verified with pt.   Pt has no other concerns at this time.     Discharge instructions have been discussed with the patient. Patient advised to call our office with any questions or concerns. Voiced understanding.

## 2024-10-08 NOTE — PROGRESS NOTES
Subjective:      Dionna Muniz is being seen today for her obstetrical visit.  She is at 21 and 5/7 weeks gestation. Patient reports no complaints, no bleeding, no cramping, no leaking, no contractions.   Fetal Movement: normal, patient has prior history of drug abuse.  States that she had been using fentanyl.  Notes no recent drug use.  Has no obstetric complaints today.  Notes good fetal movement.  Notes no pelvic pain.    Objective:      /62   Pulse 84   Wt 85.3 kg (188 lb)   LMP 05/09/2024 (Exact Date)   BMI 31.28 kg/m²   FHT: 150 BPM   Uterine Size: S=D      Assessment:      Pregnancy 21 and 5/7 weeks     Plan:      Prenatal labs ordered.  Rx sent for prenatal vitamins with folic acid.  Anatera test ordered.  Follow-up as needed.  Patient had recent ultrasound with MFM for estimated fetal weight and viability noted to have female infant.  Follow up: 4 weeks

## 2024-10-09 LAB
AMPHETAMINE SCREEN URINE: NEGATIVE
BARBITURATE SCREEN URINE: NEGATIVE
BENZODIAZEPINE SCREEN, URINE: NEGATIVE
BUPRENORPHINE URINE: POSITIVE
CANNABINOID SCREEN URINE: NEGATIVE
COCAINE METABOLITE, URINE: NEGATIVE
FENTANYL URINE: NEGATIVE
HBV SURFACE AG SERPL QL IA: NONREACTIVE
HCV AB SERPL QL IA: NONREACTIVE
HIV 1+2 AB+HIV1 P24 AG SERPL QL IA: NONREACTIVE
METHADONE SCREEN, URINE: NEGATIVE
OPIATES, URINE: NEGATIVE
OXYCODONE SCREEN URINE: NEGATIVE
PCP,URINE: NEGATIVE
RPR SER QL: NONREACTIVE
TEST INFORMATION: ABNORMAL

## 2024-10-10 DIAGNOSIS — Z3A.21 21 WEEKS GESTATION OF PREGNANCY: ICD-10-CM

## 2024-10-10 LAB
CULTURE: ABNORMAL
CULTURE: ABNORMAL
SPECIMEN DESCRIPTION: ABNORMAL
VZV IGG SER QL IA: ABNORMAL

## 2024-10-11 LAB — GYNECOLOGY CYTOLOGY REPORT: NORMAL

## 2024-10-12 LAB
MICROORGANISM SPEC CULT: ABNORMAL
MICROORGANISM SPEC CULT: ABNORMAL
SPECIMEN DESCRIPTION: ABNORMAL

## 2024-10-14 LAB
Lab: NORMAL
NTRA FETAL FRACTION: NORMAL
NTRA GENDER OF FETUS: NORMAL
NTRA MONOSOMY X AGE-BASED RISK TEXT: NORMAL
NTRA MONOSOMY X RESULT TEXT: NORMAL
NTRA MONOSOMY X RISK SCORE TEXT: NORMAL
NTRA TRIPLOIDY RESULT TEXT: NORMAL
NTRA TRISOMY 13 AGE-BASED RISK TEXT: NORMAL
NTRA TRISOMY 13 RESULT TEXT: NORMAL
NTRA TRISOMY 13 RISK SCORE TEXT: NORMAL
NTRA TRISOMY 18 AGE-BASED RISK TEXT: NORMAL
NTRA TRISOMY 18 RESULT TEXT: NORMAL
NTRA TRISOMY 18 RISK SCORE TEXT: NORMAL
NTRA TRISOMY 21 AGE-BASED RISK TEXT: NORMAL
NTRA TRISOMY 21 RESULT TEXT: NORMAL
NTRA TRISOMY 21 RISK SCORE TEXT: NORMAL

## 2024-10-16 ENCOUNTER — ANCILLARY PROCEDURE (OUTPATIENT)
Dept: OBGYN CLINIC | Age: 24
End: 2024-10-16
Payer: MEDICAID

## 2024-10-16 ENCOUNTER — ROUTINE PRENATAL (OUTPATIENT)
Dept: OBGYN CLINIC | Age: 24
End: 2024-10-16
Payer: MEDICAID

## 2024-10-16 VITALS
HEART RATE: 87 BPM | DIASTOLIC BLOOD PRESSURE: 55 MMHG | SYSTOLIC BLOOD PRESSURE: 101 MMHG | BODY MASS INDEX: 30.95 KG/M2 | WEIGHT: 186 LBS

## 2024-10-16 DIAGNOSIS — O09.892 HX OF PRETERM DELIVERY, CURRENTLY PREGNANT, SECOND TRIMESTER: ICD-10-CM

## 2024-10-16 DIAGNOSIS — Z36.9 ENCOUNTER FOR FETAL ULTRASOUND: Primary | ICD-10-CM

## 2024-10-16 PROBLEM — Z3A.22 22 WEEKS GESTATION OF PREGNANCY: Status: ACTIVE | Noted: 2024-07-01

## 2024-10-16 PROCEDURE — 76817 TRANSVAGINAL US OBSTETRIC: CPT | Performed by: OBSTETRICS & GYNECOLOGY

## 2024-10-16 PROCEDURE — 76815 OB US LIMITED FETUS(S): CPT | Performed by: OBSTETRICS & GYNECOLOGY

## 2024-10-16 PROCEDURE — 81002 URINALYSIS NONAUTO W/O SCOPE: CPT | Performed by: OBSTETRICS & GYNECOLOGY

## 2024-10-16 PROCEDURE — 99213 OFFICE O/P EST LOW 20 MIN: CPT | Performed by: OBSTETRICS & GYNECOLOGY

## 2024-10-16 PROCEDURE — 99999 PR OFFICE/OUTPT VISIT,PROCEDURE ONLY: CPT | Performed by: OBSTETRICS & GYNECOLOGY

## 2024-10-16 NOTE — PROGRESS NOTES
[facility] [place] [facility]  [address] [place] [address]  [place] [address]  Dept: [phone]  Loc: [phone]     10/16/2024    RE:  Dionna Muniz     : 2000   AGE: 23 y.o.     Dear Dr. Mancia,    Thank you for allowing me to see Dionna Muniz.  As I'm sure you will recall, Dionna Muniz is a 23 y.o. [id number]'s last menstrual period was 2024 (exact date). Estimated Date of Delivery: 25 at 22w6d seen in our office today for the following:    REASON FOR VISIT: Cervical length and completion of anatomy    Patient Active Problem List    Diagnosis Date Noted    Marijuana use 2023    Hx of  delivery, currently pregnant, second trimester 10/02/2024    22 weeks gestation of pregnancy 2024    Second pregnancy 2024    Cigarette smoker 2024        PAST HISTORY:  OB History    Para Term  AB Living   2 1   1   1   SAB IAB Ectopic Molar Multiple Live Births           0 1      # Outcome Date GA Lbr Russell/2nd Weight Sex Type Anes PTL Lv   2 Current            1  23 33w4d 09:15 / 00:22 2.18 kg (4 lb 12.9 oz) M Vag-Spont EPI Y MELO      Complications:  Labor, Drug abuse, daily use (HCC)          MEDICAL:  Past Medical History:   Diagnosis Date    GERD (gastroesophageal reflux disease)     Mild opioid use disorder, in sustained remission (HCC)     Opioid dependence on agonist therapy (HCC) 02/15/2023    Positive urine drug screen (cannabinoids and fentanyl) 2023        SURGICAL:  No past surgical history on file.    ALLERGIES:    Allergies   Allergen Reactions    Dust Mite Mixed Allergen Ext [Mite (D. Farinae)] Itching    Pollen Extract-Tree Extract [Pollen Extract]          MEDICATIONS:    Current Outpatient Medications   Medication Sig Dispense Refill    buprenorphine-naloxone (SUBOXONE) 8-2 MG FILM SL film       Prenatal Vit-Fe Fumarate-FA (PRENATAL VITAMIN) 27-0.8 MG TABS

## 2024-10-16 NOTE — PROGRESS NOTES
Patient is here today for ultrasound. Denies any vaginal bleeding, cramping, or leakage of fluids.

## 2024-11-07 ENCOUNTER — ROUTINE PRENATAL (OUTPATIENT)
Age: 24
End: 2024-11-07

## 2024-11-07 VITALS
WEIGHT: 194.1 LBS | HEART RATE: 101 BPM | DIASTOLIC BLOOD PRESSURE: 74 MMHG | SYSTOLIC BLOOD PRESSURE: 106 MMHG | BODY MASS INDEX: 32.3 KG/M2

## 2024-11-07 DIAGNOSIS — Z3A.26 26 WEEKS GESTATION OF PREGNANCY: Primary | ICD-10-CM

## 2024-11-07 LAB
GLUCOSE URINE, POC: NEGATIVE MG/DL
PROTEIN UA: POSITIVE

## 2024-11-07 RX ORDER — NICOTINE 21 MG/24HR
PATCH, TRANSDERMAL 24 HOURS TRANSDERMAL
COMMUNITY
Start: 2024-10-11

## 2024-11-07 NOTE — PROGRESS NOTES
Pt presents for routine prenatal appointment.   Pt denies bleeding, cramping, or leaking of fluids.   Pt is feeling appropriate fetal movement.   Fetal HT: 147   Urine is negative for glucose and trace protein.  Pharmacy and medications reviewed and verified with pt.    No other concerns at this time.

## 2024-11-07 NOTE — PROGRESS NOTES
Subjective:      Dionna Muniz is being seen today for her obstetrical visit.  She is at 26 and 0/7 weeks gestation. Patient reports no complaints, no bleeding, no cramping, no leaking, 26-week labs ordered patient states you receive a phone call stating that she would need a penicillin shot before delivery.  Patient has negative RPR.  There are no records of office or MFM calling patient.  Discussed with patient that reviewed chart and find no reason for antibiotic treatment for delivery..   Fetal Movement: normal.     Objective:      /74   Pulse (!) 101   Wt 88 kg (194 lb 1.6 oz)   LMP 05/09/2024 (Exact Date)   BMI 32.30 kg/m²   FHT: 140s BPM   Uterine Size: S=D      Assessment:      Pregnancy 26 and 0/7 weeks     Plan:      OBGCT: Discussed  26 weeks labs ordered.  Repeat RPR.  Follow-up 3-week  Follow up: 3 weeks     Chaperone present for exam discussion

## 2024-12-04 ENCOUNTER — TELEPHONE (OUTPATIENT)
Age: 24
End: 2024-12-04

## 2024-12-04 NOTE — TELEPHONE ENCOUNTER
Reached out to pt on 12/2, 12/3 and 12/4 to reschedule appt due to dr SCOTT in afternoon of 12/5. Left three vm with no return calls. Cx appt.

## 2025-01-13 ENCOUNTER — TELEPHONE (OUTPATIENT)
Dept: OBGYN | Age: 25
End: 2025-01-13

## 2025-01-13 NOTE — TELEPHONE ENCOUNTER
Pt said she was seeing a Dr in [place] but recently has moved to [place] and wants to start seeing a provider in [place].  She is currently 35 weeks pregnant.  Please advise on scheduling. Thanks

## 2025-01-17 ENCOUNTER — HOSPITAL ENCOUNTER (INPATIENT)
Age: 25
LOS: 2 days | Discharge: HOME | DRG: 560 | End: 2025-01-20
Attending: OBSTETRICS & GYNECOLOGY | Admitting: OBSTETRICS & GYNECOLOGY
Payer: MEDICAID

## 2025-01-18 PROBLEM — Z3A.36 36 WEEKS GESTATION OF PREGNANCY: Status: ACTIVE | Noted: 2025-01-18

## 2025-01-18 LAB
AMPHET UR QL SCN: NEGATIVE
BARBITURATES UR QL SCN: NEGATIVE
BENZODIAZ UR QL: NEGATIVE
BUPRENORPHINE UR QL: POSITIVE
CANNABINOIDS UR QL SCN: NEGATIVE
COCAINE UR QL SCN: NEGATIVE
ERYTHROCYTE [DISTWIDTH] IN BLOOD BY AUTOMATED COUNT: 13.3 % (ref 11.5–15)
FENTANYL UR QL: NEGATIVE
HCT VFR BLD AUTO: 31 % (ref 34–48)
HGB BLD-MCNC: 10.3 G/DL (ref 11.5–15.5)
MCH RBC QN AUTO: 31.1 PG (ref 26–35)
MCHC RBC AUTO-ENTMCNC: 33.2 G/DL (ref 32–34.5)
MCV RBC AUTO: 93.7 FL (ref 80–99.9)
METHADONE UR QL: NEGATIVE
OPIATES UR QL SCN: NEGATIVE
OXYCODONE UR QL SCN: NEGATIVE
PCP UR QL SCN: NEGATIVE
PLATELET # BLD AUTO: 397 K/UL (ref 130–450)
PMV BLD AUTO: 9.3 FL (ref 7–12)
RBC # BLD AUTO: 3.31 M/UL (ref 3.5–5.5)
TEST INFORMATION: ABNORMAL
WBC OTHER # BLD: 18.2 K/UL (ref 4.5–11.5)

## 2025-01-18 PROCEDURE — 86765 RUBEOLA ANTIBODY: CPT

## 2025-01-18 PROCEDURE — 86920 COMPATIBILITY TEST SPIN: CPT

## 2025-01-18 PROCEDURE — 86870 RBC ANTIBODY IDENTIFICATION: CPT

## 2025-01-18 PROCEDURE — 86880 COOMBS TEST DIRECT: CPT

## 2025-01-18 PROCEDURE — 6370000000 HC RX 637 (ALT 250 FOR IP): Performed by: ADVANCED PRACTICE MIDWIFE

## 2025-01-18 PROCEDURE — 86901 BLOOD TYPING SEROLOGIC RH(D): CPT

## 2025-01-18 PROCEDURE — 86850 RBC ANTIBODY SCREEN: CPT

## 2025-01-18 PROCEDURE — 2500000003 HC RX 250 WO HCPCS: Performed by: ADVANCED PRACTICE MIDWIFE

## 2025-01-18 PROCEDURE — 88307 TISSUE EXAM BY PATHOLOGIST: CPT

## 2025-01-18 PROCEDURE — 87591 N.GONORRHOEAE DNA AMP PROB: CPT

## 2025-01-18 PROCEDURE — 6370000000 HC RX 637 (ALT 250 FOR IP)

## 2025-01-18 PROCEDURE — G0480 DRUG TEST DEF 1-7 CLASSES: HCPCS

## 2025-01-18 PROCEDURE — 6360000002 HC RX W HCPCS: Performed by: ADVANCED PRACTICE MIDWIFE

## 2025-01-18 PROCEDURE — 86900 BLOOD TYPING SEROLOGIC ABO: CPT

## 2025-01-18 PROCEDURE — 86735 MUMPS ANTIBODY: CPT

## 2025-01-18 PROCEDURE — 80307 DRUG TEST PRSMV CHEM ANLYZR: CPT

## 2025-01-18 PROCEDURE — 2580000003 HC RX 258: Performed by: OBSTETRICS & GYNECOLOGY

## 2025-01-18 PROCEDURE — 85027 COMPLETE CBC AUTOMATED: CPT

## 2025-01-18 PROCEDURE — 7200000001 HC VAGINAL DELIVERY

## 2025-01-18 PROCEDURE — 86922 COMPATIBILITY TEST ANTIGLOB: CPT

## 2025-01-18 PROCEDURE — 1220000000 HC SEMI PRIVATE OB R&B

## 2025-01-18 PROCEDURE — 86762 RUBELLA ANTIBODY: CPT

## 2025-01-18 PROCEDURE — 87491 CHLMYD TRACH DNA AMP PROBE: CPT

## 2025-01-18 PROCEDURE — 6360000002 HC RX W HCPCS: Performed by: OBSTETRICS & GYNECOLOGY

## 2025-01-18 PROCEDURE — 6360000002 HC RX W HCPCS

## 2025-01-18 PROCEDURE — 0HQ9XZZ REPAIR PERINEUM SKIN, EXTERNAL APPROACH: ICD-10-PCS | Performed by: OBSTETRICS & GYNECOLOGY

## 2025-01-18 RX ORDER — ONDANSETRON 4 MG/1
4 TABLET, ORALLY DISINTEGRATING ORAL EVERY 6 HOURS PRN
Status: DISCONTINUED | OUTPATIENT
Start: 2025-01-18 | End: 2025-01-20 | Stop reason: HOSPADM

## 2025-01-18 RX ORDER — SODIUM CHLORIDE 0.9 % (FLUSH) 0.9 %
5-40 SYRINGE (ML) INJECTION EVERY 12 HOURS SCHEDULED
Status: DISCONTINUED | OUTPATIENT
Start: 2025-01-18 | End: 2025-01-20 | Stop reason: HOSPADM

## 2025-01-18 RX ORDER — BUTORPHANOL TARTRATE 2 MG/ML
2 INJECTION, SOLUTION INTRAMUSCULAR; INTRAVENOUS
Status: DISCONTINUED | OUTPATIENT
Start: 2025-01-18 | End: 2025-01-18

## 2025-01-18 RX ORDER — ONDANSETRON 4 MG/1
4 TABLET, ORALLY DISINTEGRATING ORAL EVERY 6 HOURS PRN
Status: DISCONTINUED | OUTPATIENT
Start: 2025-01-18 | End: 2025-01-18

## 2025-01-18 RX ORDER — DOCUSATE SODIUM 100 MG/1
100 CAPSULE, LIQUID FILLED ORAL 2 TIMES DAILY
Status: DISCONTINUED | OUTPATIENT
Start: 2025-01-18 | End: 2025-01-20 | Stop reason: HOSPADM

## 2025-01-18 RX ORDER — METHYLERGONOVINE MALEATE 0.2 MG/ML
200 INJECTION INTRAVENOUS PRN
Status: DISCONTINUED | OUTPATIENT
Start: 2025-01-18 | End: 2025-01-18

## 2025-01-18 RX ORDER — ACETAMINOPHEN 500 MG
1000 TABLET ORAL EVERY 8 HOURS SCHEDULED
Status: DISCONTINUED | OUTPATIENT
Start: 2025-01-18 | End: 2025-01-20 | Stop reason: HOSPADM

## 2025-01-18 RX ORDER — MISOPROSTOL 200 UG/1
400 TABLET ORAL PRN
Status: DISCONTINUED | OUTPATIENT
Start: 2025-01-18 | End: 2025-01-18

## 2025-01-18 RX ORDER — FERROUS SULFATE 325(65) MG
325 TABLET ORAL EVERY OTHER DAY
Status: DISCONTINUED | OUTPATIENT
Start: 2025-01-18 | End: 2025-01-20

## 2025-01-18 RX ORDER — SODIUM CHLORIDE, SODIUM LACTATE, POTASSIUM CHLORIDE, AND CALCIUM CHLORIDE .6; .31; .03; .02 G/100ML; G/100ML; G/100ML; G/100ML
500 INJECTION, SOLUTION INTRAVENOUS PRN
Status: DISCONTINUED | OUTPATIENT
Start: 2025-01-18 | End: 2025-01-18

## 2025-01-18 RX ORDER — PENICILLIN G 3000000 [IU]/50ML
3 INJECTION, SOLUTION INTRAVENOUS EVERY 4 HOURS
Status: DISCONTINUED | OUTPATIENT
Start: 2025-01-18 | End: 2025-01-18

## 2025-01-18 RX ORDER — BUPRENORPHINE HYDROCHLORIDE AND NALOXONE HYDROCHLORIDE DIHYDRATE 8; 2 MG/1; MG/1
1.5 TABLET SUBLINGUAL DAILY
Status: DISCONTINUED | OUTPATIENT
Start: 2025-01-18 | End: 2025-01-18 | Stop reason: SDUPTHER

## 2025-01-18 RX ORDER — SODIUM CHLORIDE 9 MG/ML
25 INJECTION, SOLUTION INTRAVENOUS PRN
Status: DISCONTINUED | OUTPATIENT
Start: 2025-01-18 | End: 2025-01-18

## 2025-01-18 RX ORDER — DOCUSATE SODIUM 100 MG/1
CAPSULE, LIQUID FILLED ORAL
Status: COMPLETED
Start: 2025-01-18 | End: 2025-01-18

## 2025-01-18 RX ORDER — MODIFIED LANOLIN
OINTMENT (GRAM) TOPICAL PRN
Status: DISCONTINUED | OUTPATIENT
Start: 2025-01-18 | End: 2025-01-20 | Stop reason: HOSPADM

## 2025-01-18 RX ORDER — TRANEXAMIC ACID 10 MG/ML
1000 INJECTION, SOLUTION INTRAVENOUS
Status: DISCONTINUED | OUTPATIENT
Start: 2025-01-18 | End: 2025-01-18

## 2025-01-18 RX ORDER — BUPRENORPHINE AND NALOXONE 8; 2 MG/1; MG/1
1 FILM, SOLUBLE BUCCAL; SUBLINGUAL 2 TIMES DAILY
Status: DISCONTINUED | OUTPATIENT
Start: 2025-01-19 | End: 2025-01-20 | Stop reason: HOSPADM

## 2025-01-18 RX ORDER — NICOTINE 21 MG/24HR
1 PATCH, TRANSDERMAL 24 HOURS TRANSDERMAL DAILY
Status: DISCONTINUED | OUTPATIENT
Start: 2025-01-18 | End: 2025-01-20 | Stop reason: HOSPADM

## 2025-01-18 RX ORDER — SODIUM CHLORIDE, SODIUM LACTATE, POTASSIUM CHLORIDE, CALCIUM CHLORIDE 600; 310; 30; 20 MG/100ML; MG/100ML; MG/100ML; MG/100ML
INJECTION, SOLUTION INTRAVENOUS CONTINUOUS
Status: DISCONTINUED | OUTPATIENT
Start: 2025-01-18 | End: 2025-01-18

## 2025-01-18 RX ORDER — SODIUM CHLORIDE 0.9 % (FLUSH) 0.9 %
5-40 SYRINGE (ML) INJECTION PRN
Status: DISCONTINUED | OUTPATIENT
Start: 2025-01-18 | End: 2025-01-18

## 2025-01-18 RX ORDER — IBUPROFEN 600 MG/1
600 TABLET, FILM COATED ORAL EVERY 6 HOURS PRN
Status: DISCONTINUED | OUTPATIENT
Start: 2025-01-18 | End: 2025-01-20 | Stop reason: HOSPADM

## 2025-01-18 RX ORDER — SODIUM CHLORIDE 0.9 % (FLUSH) 0.9 %
5-40 SYRINGE (ML) INJECTION PRN
Status: DISCONTINUED | OUTPATIENT
Start: 2025-01-18 | End: 2025-01-20 | Stop reason: HOSPADM

## 2025-01-18 RX ORDER — ACETAMINOPHEN 650 MG
TABLET, EXTENDED RELEASE ORAL
Status: DISCONTINUED
Start: 2025-01-18 | End: 2025-01-18

## 2025-01-18 RX ORDER — LIDOCAINE HYDROCHLORIDE 10 MG/ML
INJECTION, SOLUTION INFILTRATION; PERINEURAL
Status: DISCONTINUED
Start: 2025-01-18 | End: 2025-01-18

## 2025-01-18 RX ORDER — SODIUM CHLORIDE 9 MG/ML
INJECTION, SOLUTION INTRAVENOUS PRN
Status: DISCONTINUED | OUTPATIENT
Start: 2025-01-18 | End: 2025-01-20 | Stop reason: HOSPADM

## 2025-01-18 RX ORDER — BUPRENORPHINE AND NALOXONE 8; 2 MG/1; MG/1
0.5 FILM, SOLUBLE BUCCAL; SUBLINGUAL NIGHTLY
Status: DISCONTINUED | OUTPATIENT
Start: 2025-01-18 | End: 2025-01-20 | Stop reason: HOSPADM

## 2025-01-18 RX ORDER — CARBOPROST TROMETHAMINE 250 UG/ML
250 INJECTION, SOLUTION INTRAMUSCULAR PRN
Status: DISCONTINUED | OUTPATIENT
Start: 2025-01-18 | End: 2025-01-18

## 2025-01-18 RX ORDER — SODIUM CHLORIDE 0.9 % (FLUSH) 0.9 %
5-40 SYRINGE (ML) INJECTION EVERY 12 HOURS SCHEDULED
Status: DISCONTINUED | OUTPATIENT
Start: 2025-01-18 | End: 2025-01-18

## 2025-01-18 RX ORDER — TERBUTALINE SULFATE 1 MG/ML
0.25 INJECTION, SOLUTION SUBCUTANEOUS
Status: DISCONTINUED | OUTPATIENT
Start: 2025-01-18 | End: 2025-01-18

## 2025-01-18 RX ORDER — PENICILLIN G POTASSIUM 5000000 [IU]/1
INJECTION, POWDER, FOR SOLUTION INTRAMUSCULAR; INTRAVENOUS
Status: COMPLETED
Start: 2025-01-18 | End: 2025-01-18

## 2025-01-18 RX ORDER — ONDANSETRON 2 MG/ML
4 INJECTION INTRAMUSCULAR; INTRAVENOUS EVERY 6 HOURS PRN
Status: DISCONTINUED | OUTPATIENT
Start: 2025-01-18 | End: 2025-01-18

## 2025-01-18 RX ORDER — ONDANSETRON 2 MG/ML
4 INJECTION INTRAMUSCULAR; INTRAVENOUS EVERY 6 HOURS PRN
Status: DISCONTINUED | OUTPATIENT
Start: 2025-01-18 | End: 2025-01-20 | Stop reason: HOSPADM

## 2025-01-18 RX ORDER — BUTORPHANOL TARTRATE 2 MG/ML
INJECTION, SOLUTION INTRAMUSCULAR; INTRAVENOUS
Status: COMPLETED
Start: 2025-01-18 | End: 2025-01-18

## 2025-01-18 RX ADMIN — DOCUSATE SODIUM 100 MG: 100 CAPSULE, LIQUID FILLED ORAL at 20:55

## 2025-01-18 RX ADMIN — ACETAMINOPHEN 1000 MG: 500 TABLET ORAL at 22:52

## 2025-01-18 RX ADMIN — BUTORPHANOL TARTRATE 2 MG: 2 INJECTION, SOLUTION INTRAMUSCULAR; INTRAVENOUS at 02:07

## 2025-01-18 RX ADMIN — IBUPROFEN 600 MG: 600 TABLET, FILM COATED ORAL at 20:55

## 2025-01-18 RX ADMIN — DEXTROSE MONOHYDRATE 5 MILLION UNITS: 50 INJECTION, SOLUTION INTRAVENOUS at 00:54

## 2025-01-18 RX ADMIN — Medication: at 09:35

## 2025-01-18 RX ADMIN — PENICILLIN G POTASSIUM 5 MILLION UNITS: 5000000 INJECTION, POWDER, FOR SOLUTION INTRAMUSCULAR; INTRAVENOUS at 00:53

## 2025-01-18 RX ADMIN — DOCUSATE SODIUM 100 MG: 100 CAPSULE, LIQUID FILLED ORAL at 09:34

## 2025-01-18 RX ADMIN — SODIUM CHLORIDE, PRESERVATIVE FREE 10 ML: 5 INJECTION INTRAVENOUS at 09:35

## 2025-01-18 RX ADMIN — ACETAMINOPHEN 1000 MG: 500 TABLET ORAL at 14:28

## 2025-01-18 RX ADMIN — BUPRENORPHINE AND NALOXONE 0.5 FILM: 8; 2 FILM, SOLUBLE BUCCAL; SUBLINGUAL at 22:06

## 2025-01-18 RX ADMIN — BUPRENORPHINE HYDROCHLORIDE AND NALOXONE HYDROCHLORIDE DIHYDRATE 1.5 TABLET: 8; 2 TABLET SUBLINGUAL at 09:35

## 2025-01-18 RX ADMIN — IBUPROFEN 600 MG: 600 TABLET, FILM COATED ORAL at 13:36

## 2025-01-18 RX ADMIN — ACETAMINOPHEN 1000 MG: 500 TABLET ORAL at 04:05

## 2025-01-18 RX ADMIN — IBUPROFEN 600 MG: 600 TABLET, FILM COATED ORAL at 06:35

## 2025-01-18 ASSESSMENT — PAIN DESCRIPTION - DESCRIPTORS
DESCRIPTORS: DISCOMFORT;CRAMPING;SORE
DESCRIPTORS: CRAMPING
DESCRIPTORS: CRAMPING;SORE;DISCOMFORT
DESCRIPTORS: BURNING;SORE;CRAMPING
DESCRIPTORS: CRAMPING
DESCRIPTORS: DISCOMFORT
DESCRIPTORS: CRAMPING

## 2025-01-18 ASSESSMENT — PAIN DESCRIPTION - ORIENTATION
ORIENTATION: MID
ORIENTATION: MID;LOWER
ORIENTATION: LOWER
ORIENTATION: LOWER

## 2025-01-18 ASSESSMENT — PAIN SCALES - GENERAL
PAINLEVEL_OUTOF10: 4
PAINLEVEL_OUTOF10: 6
PAINLEVEL_OUTOF10: 4
PAINLEVEL_OUTOF10: 4
PAINLEVEL_OUTOF10: 3
PAINLEVEL_OUTOF10: 10

## 2025-01-18 ASSESSMENT — PAIN - FUNCTIONAL ASSESSMENT
PAIN_FUNCTIONAL_ASSESSMENT: ACTIVITIES ARE NOT PREVENTED

## 2025-01-18 ASSESSMENT — PAIN DESCRIPTION - LOCATION
LOCATION: PERINEUM
LOCATION: ABDOMEN;PERINEUM
LOCATION: ABDOMEN;PERINEUM
LOCATION: ABDOMEN
LOCATION: ABDOMEN;PERINEUM

## 2025-01-18 NOTE — PROGRESS NOTES
at 36w1d presents to antepartum unit with complaints of ctxs q 5 minutes since .   Denies vb, lof, ha, or blurred vision  States +FM  VSS

## 2025-01-18 NOTE — PROGRESS NOTES
RN remained at bedside throughout pushing.  EFM continuously assessed.  Vaginal delivery of viable infant.

## 2025-01-18 NOTE — PROGRESS NOTES
Spoke with SADIE Garzon, notified that patient is requesting a nicotine patch and will have her boyfriend bring in suboxone film she normally takes to be verified by pharmacy.

## 2025-01-18 NOTE — PROGRESS NOTES
RN at bedside. US continuously adjusted. Patient very uncomfortable and awaiting epidural. Patient moving around in the bed due to pain

## 2025-01-18 NOTE — PROGRESS NOTES
Admitted to room 305 from L&D via wheelchair with infant and accompanied by RN from L&D.  Oriented to call light at bedside, RN cell number, Doctor's orders, need to call for help with first out of bed to void, infant safety and security, pamphlets at bedside, visitation policy of one overnight visitor over the age of 18, and ordering meals.

## 2025-01-18 NOTE — LACTATION NOTE
Introduced myself to patient, we discussed her experience feeding her first baby along with her goals for feeding this baby. She declined breastfeeding education and said she would read over the breastfeeding book instead. I reviewed feeding frequency and the importance of frequent stimulation of the breasts. I also explained that latching baby at the breast will help her supply. She requested an electric breast pump set up so she can latch and pump. I set up the pump and encouraged her to call when ready to use it so I can go over how to use.

## 2025-01-18 NOTE — PROGRESS NOTES
Patient actively pushing. CNM at bedside. RN remains in continuous attendance at the bedside.  Assessment & evaluation of fetal heart rate ongoing via continuous EFM.

## 2025-01-18 NOTE — H&P
Department of Obstetrics and Gynecology  Attending Obstetrics History and Physical        CHIEF COMPLAINT:  contractions    HISTORY OF PRESENT ILLNESS:       at 36w2d presents to antepartum unit with complaints of ctxs q 5 minutes since .   Denies vb, lof, ha, or blurred vision  States +FM  VSS    DATES:    CASSANDRA: 2025      PRENATAL CARE:    [facility]    PAST OB HISTORY      OB History    Para Term  AB Living   2 1   1   1   SAB IAB Ectopic Molar Multiple Live Births           0 1      # Outcome Date GA Lbr Russell/2nd Weight Sex Type Anes PTL Lv   2 Current            1  23 33w4d 09:15 / 00:22 2.18 kg (4 lb 12.9 oz) M Vag-Spont EPI Y MELO      Complications:  Labor, Drug abuse, daily use ([facility])     Past Gynecological History:      Last menstrual period:  Patient's last menstrual period was 2024 (exact date).      Past Medical History:        Diagnosis Date    GERD (gastroesophageal reflux disease)     Mild opioid use disorder, in sustained remission ([facility])     Opioid dependence on agonist therapy ([facility]) 02/15/2023    Positive urine drug screen (cannabinoids and fentanyl) 2023     Past Surgical History:    History reviewed. No pertinent surgical history.    Medications Prior to Admission:  Medications Prior to Admission: buprenorphine-naloxone (SUBOXONE) 8-2 MG FILM SL film,   Prenatal Vit-Fe Fumarate-FA (PRENATAL VITAMIN) 27-0.8 MG TABS, Take 1 tablet by mouth daily  folic acid (FOLVITE) 1 MG tablet, Take 1 tablet by mouth daily  famotidine (PEPCID) 20 MG tablet, Take 1 tablet by mouth 2 times daily  nicotine (NICODERM CQ) 14 MG/24HR, APPLY 1 PATCH TOPICALLY TO THE SKIN EVERY DAY (Patient not taking: Reported on 2025)  naloxone 4 MG/0.1ML LIQD nasal spray,   Prenatal Vit-Fe Fumarate-FA (PRENATAL VITAMINS) 28-0.8 MG TABS, Take 1 tablet by mouth daily  Allergies:  Dust mite mixed allergen ext [mite (d. farinae)] and Pollen extract-tree extract

## 2025-01-18 NOTE — PROCEDURES
PROCEDURE NOTE  Date: 2025   Name: Dionna Muniz  YOB: 2000    Procedures    Department of Obstetrics and Gynecology  Spontaneous Vaginal Delivery Note    Patient:  Dionna Muniz     Admit Date:  2025 10:47 PM  Medical Record Number:  [id number]   Procedure Date: 2025 2:37 AM     Pre-delivery Diagnosis:   pregnancy <37 weeks and Spontaneous labor    Post-delivery Diagnosis:  Living  infant(s) and Female    Information for the patient's :  Brooks Muniz [[id number]]                  Infant Wt:   Information for the patient's :  Brooks Muniz [[id number]]         APGARS:     Information for the patient's :  Brooks Muniz [[id number]]        Anesthesia:  none, local for repair    Application and Delivery:  24 y.o.  female  at 36w2d admitted for active phase labor who progressed to complete and delivered Cephalic, occiput anterior presentation via  @ 0152, under none anesthesia,  over an intact perineum no episiotomy with a resulting 1st degree and periurethral laceration, without dystocia or complication, a Live Born Female infant, weighing 5# 9oz, 2520 grams, Clear fluid at delivery, bulb suctioned on perineum. A nuchal cord was not present.  A delayed cord clamping was performed for 30 seconds.  Spontaneous cry,  Apgar's 1 minute:  8; 5 minute:  9. The placenta was delivered with gentle traction and was noted to be intact, whole, and that the umbilical cord had three vessels noted. Episiotomy was not needed due to a spontaneous 1st degree vaginal laceration and bilateral periurethral .  Repair was necessary. performed  per routine with  Vicryl 3.0 suture x 2.  Cervix, rectum, sphincter intact. Sponge, needle, and instrument counts correct x 2.    Delivery Summary:     Brooks Muniz [[id number]]      Labor Events     Labor: No  Cervical Ripening Date/Time:      Antibiotics Received during Labor: Yes  Rupture

## 2025-01-19 PROBLEM — Z34.90 SECOND PREGNANCY: Status: RESOLVED | Noted: 2024-07-01 | Resolved: 2025-01-19

## 2025-01-19 PROBLEM — Z3A.36 36 WEEKS GESTATION OF PREGNANCY: Status: RESOLVED | Noted: 2025-01-18 | Resolved: 2025-01-19

## 2025-01-19 PROBLEM — Z3A.22 22 WEEKS GESTATION OF PREGNANCY: Status: RESOLVED | Noted: 2024-07-01 | Resolved: 2025-01-19

## 2025-01-19 PROBLEM — O09.892 HX OF PRETERM DELIVERY, CURRENTLY PREGNANT, SECOND TRIMESTER: Status: RESOLVED | Noted: 2024-10-02 | Resolved: 2025-01-19

## 2025-01-19 LAB
ERYTHROCYTE [DISTWIDTH] IN BLOOD BY AUTOMATED COUNT: 13.4 % (ref 11.5–15)
HCT VFR BLD AUTO: 29.3 % (ref 34–48)
HGB BLD-MCNC: 9.1 G/DL (ref 11.5–15.5)
MCH RBC QN AUTO: 30 PG (ref 26–35)
MCHC RBC AUTO-ENTMCNC: 31.1 G/DL (ref 32–34.5)
MCV RBC AUTO: 96.7 FL (ref 80–99.9)
PLATELET # BLD AUTO: 335 K/UL (ref 130–450)
PMV BLD AUTO: 9.7 FL (ref 7–12)
RBC # BLD AUTO: 3.03 M/UL (ref 3.5–5.5)
WBC OTHER # BLD: 13.4 K/UL (ref 4.5–11.5)

## 2025-01-19 PROCEDURE — 6370000000 HC RX 637 (ALT 250 FOR IP): Performed by: ADVANCED PRACTICE MIDWIFE

## 2025-01-19 PROCEDURE — 6360000002 HC RX W HCPCS

## 2025-01-19 PROCEDURE — 85027 COMPLETE CBC AUTOMATED: CPT

## 2025-01-19 PROCEDURE — 1220000000 HC SEMI PRIVATE OB R&B

## 2025-01-19 RX ORDER — IBUPROFEN 600 MG/1
600 TABLET, FILM COATED ORAL EVERY 6 HOURS PRN
Qty: 120 TABLET | Refills: 3 | Status: SHIPPED | OUTPATIENT
Start: 2025-01-19

## 2025-01-19 RX ORDER — ACETAMINOPHEN 500 MG
1000 TABLET ORAL 3 TIMES DAILY PRN
Qty: 60 TABLET | Refills: 0 | Status: SHIPPED | OUTPATIENT
Start: 2025-01-19

## 2025-01-19 RX ADMIN — BUPRENORPHINE AND NALOXONE 1 FILM: 8; 2 FILM, SOLUBLE BUCCAL; SUBLINGUAL at 08:54

## 2025-01-19 RX ADMIN — ACETAMINOPHEN 1000 MG: 500 TABLET ORAL at 08:54

## 2025-01-19 RX ADMIN — IBUPROFEN 600 MG: 600 TABLET, FILM COATED ORAL at 05:50

## 2025-01-19 RX ADMIN — ACETAMINOPHEN 1000 MG: 500 TABLET ORAL at 18:05

## 2025-01-19 RX ADMIN — DOCUSATE SODIUM 100 MG: 100 CAPSULE, LIQUID FILLED ORAL at 09:00

## 2025-01-19 RX ADMIN — Medication: at 08:54

## 2025-01-19 RX ADMIN — IBUPROFEN 600 MG: 600 TABLET, FILM COATED ORAL at 11:46

## 2025-01-19 RX ADMIN — DOCUSATE SODIUM 100 MG: 100 CAPSULE, LIQUID FILLED ORAL at 20:51

## 2025-01-19 RX ADMIN — FERROUS SULFATE TAB 325 MG (65 MG ELEMENTAL FE) 325 MG: 325 (65 FE) TAB at 08:55

## 2025-01-19 RX ADMIN — BUPRENORPHINE AND NALOXONE 0.5 FILM: 8; 2 FILM, SOLUBLE BUCCAL; SUBLINGUAL at 20:51

## 2025-01-19 RX ADMIN — IRON SUCROSE 200 MG: 20 INJECTION, SOLUTION INTRAVENOUS at 12:39

## 2025-01-19 RX ADMIN — BUPRENORPHINE AND NALOXONE 1 FILM: 8; 2 FILM, SOLUBLE BUCCAL; SUBLINGUAL at 15:52

## 2025-01-19 RX ADMIN — IBUPROFEN 600 MG: 600 TABLET, FILM COATED ORAL at 20:51

## 2025-01-19 ASSESSMENT — PAIN SCALES - GENERAL
PAINLEVEL_OUTOF10: 3
PAINLEVEL_OUTOF10: 4

## 2025-01-19 ASSESSMENT — PAIN DESCRIPTION - LOCATION: LOCATION: ABDOMEN

## 2025-01-19 ASSESSMENT — PAIN DESCRIPTION - ORIENTATION: ORIENTATION: LOWER;MID

## 2025-01-19 ASSESSMENT — PAIN DESCRIPTION - DESCRIPTORS: DESCRIPTORS: CRAMPING

## 2025-01-19 ASSESSMENT — PAIN - FUNCTIONAL ASSESSMENT: PAIN_FUNCTIONAL_ASSESSMENT: ACTIVITIES ARE NOT PREVENTED

## 2025-01-19 NOTE — PROGRESS NOTES
PPD #1    Patient:  Dionna Muniz    Admit Date:  2025 10:47 PM  Medical Record Number:  [id number] Today's Date: 2025    S: No complaints aside from some fatigue. She is tolerating regular diet, ambulating well, notes decreasing vaginal bleeding, and is voiding without difficulty. She has not had a BM but is passing flatus. Her pain is well-managed with current regimen. She denies chest pain, shortness of breath, palpitations, or dizziness.      Patient Active Problem List   Diagnosis    Marijuana use    Uncomplicated opioid dependence (HCC)    Cigarette smoker     (spontaneous vaginal delivery)       O:   Vitals:    25 1353 25 1908 25 2314 25 0718   BP: 119/67 (!) 120/58 118/69 (!) 106/59   Pulse: 90 78 67 56   Resp: 16 16 16 18   Temp: 98.6 °F (37 °C) 98.1 °F (36.7 °C) 97.9 °F (36.6 °C) 97.7 °F (36.5 °C)   TempSrc: Oral Oral Oral Oral   SpO2: 95% 96% 97% 98%   Weight:       Height:         Gen: A&O, cooperative, pleasant   Heart: RRR   Lungs: CTA b/l   Abd; soft, NT, non distended, +BS  Ext: No clubbing, cyanosis, edema:trace, no calf tenderness   Neuro: intact   Uterus: firm, well contracted, non-tender U/1  Yana pad: ice pack with small lochia rubra    Lab Results   Component Value Date    HGB 9.1 (L) 2025    HCT 29.3 (L) 2025      Recent Results (from the past 72 hour(s))   Drug Screen Multi Urine With Bup    Collection Time: 25 12:35 AM   Result Value Ref Range    Amphetamine Screen, Ur NEGATIVE NEGATIVE    Barbiturate Screen, Ur NEGATIVE NEGATIVE    Cocaine Metabolite, Urine NEGATIVE NEGATIVE    Benzodiazepine Screen, Urine NEGATIVE NEGATIVE    Buprenorphine Urine POSITIVE (A) NEGATIVE    Methadone Screen, Urine NEGATIVE NEGATIVE    Opiates, Urine NEGATIVE NEGATIVE    Phencyclidine, Urine NEGATIVE NEGATIVE    Cannabinoid Scrn, Ur NEGATIVE NEGATIVE    Fentanyl, Ur NEGATIVE NEGATIVE    Oxycodone Screen, Ur NEGATIVE NEGATIVE    Test Information       These

## 2025-01-19 NOTE — LACTATION NOTE
Mom reports latch seems shallow and painful. Tried assisting at this time, but baby sleepy. Latched well but not sucking at the breast, tips given to try with next feed and encouraged mom to call me.

## 2025-01-19 NOTE — PROGRESS NOTES
[facility] [place] [facility] screening results were discussed with parent. Questions answered. Brochure given to parent. Advised to monitor developmental milestones and contact physician for any concerns.       Sameer Howard CCC/CHRISTINE  Audiologist  [id number]  NPI#:  [id number]

## 2025-01-20 VITALS
HEART RATE: 73 BPM | SYSTOLIC BLOOD PRESSURE: 96 MMHG | WEIGHT: 205 LBS | HEIGHT: 65 IN | TEMPERATURE: 98.6 F | OXYGEN SATURATION: 97 % | RESPIRATION RATE: 16 BRPM | DIASTOLIC BLOOD PRESSURE: 58 MMHG | BODY MASS INDEX: 34.16 KG/M2

## 2025-01-20 PROCEDURE — APPNB15 APP NON BILLABLE TIME 0-15 MINS: Performed by: PHYSICIAN ASSISTANT

## 2025-01-20 PROCEDURE — 6370000000 HC RX 637 (ALT 250 FOR IP): Performed by: ADVANCED PRACTICE MIDWIFE

## 2025-01-20 RX ORDER — PSEUDOEPHEDRINE HCL 30 MG
100 TABLET ORAL 2 TIMES DAILY
Qty: 20 CAPSULE | Refills: 0 | Status: SHIPPED | OUTPATIENT
Start: 2025-01-20

## 2025-01-20 RX ORDER — FERROUS SULFATE 325(65) MG
325 TABLET ORAL 2 TIMES DAILY WITH MEALS
Status: DISCONTINUED | OUTPATIENT
Start: 2025-01-20 | End: 2025-01-20 | Stop reason: HOSPADM

## 2025-01-20 RX ADMIN — IBUPROFEN 600 MG: 600 TABLET, FILM COATED ORAL at 10:50

## 2025-01-20 RX ADMIN — BUPRENORPHINE AND NALOXONE 1 FILM: 8; 2 FILM, SOLUBLE BUCCAL; SUBLINGUAL at 15:59

## 2025-01-20 RX ADMIN — DOCUSATE SODIUM 100 MG: 100 CAPSULE, LIQUID FILLED ORAL at 07:47

## 2025-01-20 RX ADMIN — BUPRENORPHINE AND NALOXONE 1 FILM: 8; 2 FILM, SOLUBLE BUCCAL; SUBLINGUAL at 07:47

## 2025-01-20 RX ADMIN — ACETAMINOPHEN 1000 MG: 500 TABLET ORAL at 15:59

## 2025-01-20 RX ADMIN — IBUPROFEN 600 MG: 600 TABLET, FILM COATED ORAL at 03:10

## 2025-01-20 RX ADMIN — ACETAMINOPHEN 1000 MG: 500 TABLET ORAL at 06:41

## 2025-01-20 ASSESSMENT — PAIN SCALES - GENERAL
PAINLEVEL_OUTOF10: 3

## 2025-01-20 NOTE — LACTATION NOTE
Mom is starting to get engorged. Instructed her to pump just enough colostrum so the breast softens a bit.  Gave mom ice packs as well.  Went over reverse pressure softening if latch is difficult.

## 2025-01-20 NOTE — PLAN OF CARE
Problem: Discharge Planning  Goal: Discharge to home or other facility with appropriate resources  Outcome: Progressing     Problem: Pain  Goal: Verbalizes/displays adequate comfort level or baseline comfort level  Outcome: Progressing     Problem: Postpartum  Goal: Experiences normal postpartum course  Description:  Postpartum OB-Pregnancy care plan goal which identifies if the mother is experiencing a normal postpartum course  Outcome: Progressing  Goal: Appropriate maternal -  bonding  Description:  Postpartum OB-Pregnancy care plan goal which identifies if the mother and  are bonding appropriately  Outcome: Progressing  Goal: Establishment of infant feeding pattern  Description:  Postpartum OB-Pregnancy care plan goal which identifies if the mother is establishing a feeding pattern with their   Outcome: Progressing     Problem: Safety - Adult  Goal: Free from fall injury  Outcome: Progressing

## 2025-01-20 NOTE — PROGRESS NOTES
SUBJECTIVE:  Patient without complaint. Denies dizziness, lightheadedness, shortness of breath. Voiding without difficulty. Passing flatus. Tolerating regular diet. Normal lochia, denies passing clots. Denies emotional concerns. Ambulating in hallway. Breast & formula feeding infant.     OBJECTIVE:  /66   Pulse 67   Temp 98.4 °F (36.9 °C) (Oral)   Resp 16   Ht 1.651 m (5' 5\")   Wt 93 kg (205 lb)   LMP 2024 (Exact Date)   SpO2 97%  BMI 34.11 kg/m²   Recent Labs     25  0515 25  0035   WBC 13.4* 18.2*   HGB 9.1* 10.3*   HCT 29.3* 31.0*   MCV 96.7 93.7    397      Physical Exam:  General: A&O. Cooperative. Comfortable.  Coronary: RRR  Pulmonary: CTA b/l  Abdomen: soft, nontender.   Uterus firm, well-contracted, nontender  Peripad: Lochia thin rubra  Back: (-) CVA or paraspinal tenderness.   Extremities: (-) edema, (-) calf tenderness.  Neuro: Grossly intact.        ASSESSMENT/PLAN: 24 y.o. female  36w2d s/p  2025 at 01:52   Postpartum Day #2  Advance care  Acute on chronic blood loss anemia- FeSO4 ordered, asymptomatic  Discharge today, precautions given  Script for Motrin & Colace. Resume Suboxone and PNV with iron.   Follow-up advised at [facility] in 4 to 6 weeks, sooner if desired.         Jessica Power PA-C 2025 8:13 AM

## 2025-01-20 NOTE — LACTATION NOTE
Observed dyad during a breastfeed on the left breast in football hold.  Baby had a wide latch and audible swallows. Mom comfortable with position. Encouraged mom to call with questions or for help.

## 2025-01-20 NOTE — DISCHARGE INSTRUCTIONS
Follow-up with your OB doctor in 1 week if  delivery or in  6 weeks for vaginal delivery unless otherwise instructed.   Call office for an appointment.    For breastfeeding support, you can contact our lactation specialists at [phone] or [phone]    DIET  Eat a well balanced diet focusing on foods high in fiber and protein  Drink plenty of fluids especially water.  To avoid constipation you may take a mild stool softener as recommended by your doctor or midwife.    ACTIVITY  Gradually increase your activity.  Resume exercise regimen only after advised by your doctor or midwife.  Avoid lifting anything heavier than your baby or a gallon of milk for SIX weeks.   Avoid driving until your doctor or midwife has given their approval.  Rise slowly from a lying to sitting and then a standing position.  Climb stairs one at a time.  Use caution when carrying your baby up and down the stairs.  No sexual activity for 6 weeks or until advised by your doctor - Nothing in vagina: intercourse, tampons, or douching.   Be prepared to discuss family planning at your follow-up OB visit.   You may feel tired or have a lack of energy.  You may continue your prenatal vitamin to replenish nutrients post delivery.  Nap when baby naps to catch up on sleep.  May return to work or school in 6 weeks or as directed by OB.     EMOTIONS  You may feed tamayo, sad, teary, & overwhelmed.  Contact your OB provider if you feel you may be showing signs of postpartum depression, or have thoughts of harming yourself or your infant.  If infant will not stop crying, contact another adult for help or place infant in their crib on their back and take a break.  NEVER shake your infant.      BLEEDING  Vaginal bleeding will decrease in amount over the next few weeks.  You will notice that as your activity increases, your flow may increase.  This is your body's way of telling you, you need to take things easier and rest more often.  Call your

## 2025-01-20 NOTE — PROGRESS NOTES
CLINICAL PHARMACY NOTE: MEDS TO BEDS    Total # of Prescriptions Filled: 3   The following medications were delivered to the patient:  Doc 100  Pain relief 500mg   Ibu 600    Additional Documentation:

## 2025-01-21 ENCOUNTER — LACTATION ENCOUNTER (OUTPATIENT)
Dept: NURSERY | Age: 25
End: 2025-01-21

## 2025-01-21 LAB
MEV IGG SER-ACNC: NORMAL
MUV IGG SER IA-ACNC: NORMAL
RUBV IGG SERPL QL IA: NORMAL IU/ML

## 2025-01-21 NOTE — LACTATION NOTE
This note was copied from a baby's chart.  Assisted mom with positioning and latch, breasts are full. Latched well with a shield on the right side. Multiple swallows heard throughout feeding. Encouraged frequent feeds at breast, hand expression and skin to skin to establish supply. Support provided and encouraged to call with any needs.

## 2025-01-21 NOTE — CARE COORDINATION
SW Discharge Planning     Per [facility] ( [phone]) supervisor, [place], [facility] ( [phone]) will NOT be involved at this time     PLAN    Baby CAN be discharged home when medically ready, children services will NOT be involved at this time.      Electronically signed by TEDDY Roth on 1/21/2025 at 10:07 AM   
SW Discharge Planning   SW received consult for \" HX substance use, suboxone use, patient needs carseat\" ( Patient and baby positive for subutex)    KELLY met with Dionna Castillo ( [phone]) mother to baby girl Gayle Mandujano ( 1/18/25) and introduced self and role. Dionna reported that she resides at the address listed in the chart with father of the baby Michael Mandujano ( 2/28/92) and their son, Mc Mandujano ( 1/29/23). Dionna reported that she is currently unemployed and baby will be added to her [employer] medicaid. Per Dionna prenatal care was with Dr. Botello and pediatric care will be with MIGUEL ÁNGEL Munoz. Dionna Reported that she has all needed items including a car seat and pack and play. We discussed safe sleep practices. KELLY did notice a carseat in the room, and Dionna stated that she was able to find her son's car seat. Dionna was agreeable to a [facility] and WIC referral. Dionna  denied any past or current history of legal issues,  domestic violence or mental health diagnosis.  We discussed awareness of Post Partum Depression and encouraged contact with her OB if any problems arise.  Dionna did report that children services was involved with her last baby due to her substance abuse usage. Dionna stated that Children services helped her into her recovery, and that she is currently prescribed subutex through [facility]. Dionna did report that she stopped all THC usage ( positive 4/1/24) when she discovered her pregnancy. Dionna expressed understanding for the need of a [facility] ( [phone]) referral. Dionna appeared attentive and bonded to baby and was open about her past CSB and subtance abuse history.   KELLY completed [facility] ( [phone]) referral to Help Hotline, [place].    PLAN    Baby can NOT be discharged home until [facility] ( [phone]) provides disposition  SW to continue communication with nursing 
bed rails

## 2025-01-21 NOTE — FLOWSHEET NOTE
Patient given discharge papers and all questions answered. Mother Rosemary in chart. Suboxone 8-2mg SL film given back. Total dose 3 films handed to patient and confirmed with second RN.

## 2025-01-21 NOTE — DISCHARGE SUMMARY
Obstetrical Discharge Form    Patient Nam: Dionna Muniz    YOB: 2000    Medical Record Number: [id number]    Primary OB Clinician: Dr. Mancia    Reason for Hospitalization, Chief Complaint, Diagnosis, etc: 24 year old female  2 para 0101 at 36 weeks' 2 days' gestation admitted with c/o contractions. Upon presentation, her cervix wasnoted to be 6 cms dilated, 75% effaced, - 1 cm station and annie every 5 minutes. PCN G was given for GBS prophylaxis.    Gestational Age at time of delivery:  36 weeks 2 days    Date of Admission/Delivery: 2025    Delivery Type: spontaneous vaginal delivery    Anesthesia: none    Baby: Liveborn female, weighing 2520 grams with Apgar scores of 8 & 9 delivered at 01:52    Intrapartum complications: acute on chronic blood loss anemia- FeSO4 supplementation, asymptomatic.    Laceration: 1st degree bilateral periurethral, repaired- local for repair    Episiotomy: none    Feeding method: both breast and formula    Discharge Date: 2025 postpartum day # 2    Condition: both Mother and infant discharged home in stable condition  Plan:     Patient advised to call and schedule follow-up appointment with [facility] ob provider in 4 to 6 weeks, or sooner if desired.    Precautions given: call for: fever greater than 101 F, pain, heavy bleeding, vomiting, shortness of breath, breast redness or pain, calf pain, or any concerns. No heavy lifting. No sexual intercourse for 6 weeks.    Please refer to chart for further details.    Scripts: Motrin, Colace. Resume Suboxone and PNV and iron.        Jessica Power PA-C

## 2025-01-22 ENCOUNTER — LACTATION ENCOUNTER (OUTPATIENT)
Dept: NURSERY | Age: 25
End: 2025-01-22

## 2025-01-22 LAB
ABO/RH: NORMAL
ANTIBODY IDENTIFICATION: NORMAL
ANTIBODY SCREEN: POSITIVE
ARM BAND NUMBER: NORMAL
BLOOD BANK COMMENT: NORMAL
BLOOD BANK COMMENT: NORMAL
BLOOD BANK DISPENSE STATUS: NORMAL
BLOOD BANK SAMPLE EXPIRATION: NORMAL
BPU ID: NORMAL
BUPRENORPHINE UR-MCNC: 179.5 NG/ML
CHLAMYDIA DNA UR QL NAA+PROBE: ABNORMAL
COMPLIANCE DRUG ANALYSIS, URINE: NORMAL
COMPONENT: NORMAL
CROSSMATCH RESULT: NORMAL
DAT IGG: NEGATIVE
N GONORRHOEA DNA UR QL NAA+PROBE: ABNORMAL
NALOXONE, QUANTITATIVE, URINE: >1000 NG/ML
NORBUPRENORPHINE, QUANTITATIVE, URINE: 832.4 NG/ML
SPECIMEN DESCRIPTION: ABNORMAL
SPECIMEN DESCRIPTION: ABNORMAL
SURGICAL PATHOLOGY REPORT: NORMAL
TRANSFUSION STATUS: NORMAL
UNIT DIVISION: 0

## 2025-01-22 NOTE — LACTATION NOTE
This note was copied from a baby's chart.  Baby has been nursing well with a shield, multiple swallows heard throughout the feeding. Encouraged frequent feeds at breast, hand expression and skin to skin to establish supply. Support provided and encouraged to call with any needs.